# Patient Record
Sex: FEMALE | Race: BLACK OR AFRICAN AMERICAN | NOT HISPANIC OR LATINO | Employment: OTHER | URBAN - METROPOLITAN AREA
[De-identification: names, ages, dates, MRNs, and addresses within clinical notes are randomized per-mention and may not be internally consistent; named-entity substitution may affect disease eponyms.]

---

## 2023-04-03 DIAGNOSIS — I10 ESSENTIAL (PRIMARY) HYPERTENSION: ICD-10-CM

## 2023-04-03 RX ORDER — HYDROCHLOROTHIAZIDE 25 MG/1
TABLET ORAL
Qty: 90 TABLET | Refills: 1 | Status: SHIPPED | OUTPATIENT
Start: 2023-04-03 | End: 2023-10-03

## 2023-04-03 RX ORDER — ATENOLOL 50 MG/1
TABLET ORAL
Qty: 90 TABLET | Refills: 1 | Status: SHIPPED | OUTPATIENT
Start: 2023-04-03 | End: 2023-10-03

## 2023-05-10 ENCOUNTER — APPOINTMENT (OUTPATIENT)
Dept: PRIMARY CARE | Facility: CLINIC | Age: 73
End: 2023-05-10
Payer: MEDICARE

## 2023-08-30 ENCOUNTER — OFFICE VISIT (OUTPATIENT)
Dept: PRIMARY CARE | Facility: CLINIC | Age: 73
End: 2023-08-30
Payer: MEDICARE

## 2023-08-30 VITALS
BODY MASS INDEX: 28.08 KG/M2 | RESPIRATION RATE: 14 BRPM | OXYGEN SATURATION: 96 % | HEART RATE: 57 BPM | HEIGHT: 67 IN | SYSTOLIC BLOOD PRESSURE: 124 MMHG | DIASTOLIC BLOOD PRESSURE: 74 MMHG | TEMPERATURE: 95 F | WEIGHT: 178.9 LBS

## 2023-08-30 DIAGNOSIS — Z12.31 BREAST CANCER SCREENING BY MAMMOGRAM: ICD-10-CM

## 2023-08-30 DIAGNOSIS — I10 HYPERTENSION, UNSPECIFIED TYPE: ICD-10-CM

## 2023-08-30 DIAGNOSIS — Z76.89 ENCOUNTER TO ESTABLISH CARE: ICD-10-CM

## 2023-08-30 DIAGNOSIS — Z00.00 ROUTINE HISTORY AND PHYSICAL EXAMINATION OF ADULT: Primary | ICD-10-CM

## 2023-08-30 DIAGNOSIS — E66.3 OVERWEIGHT WITH BODY MASS INDEX (BMI) OF 28 TO 28.9 IN ADULT: ICD-10-CM

## 2023-08-30 DIAGNOSIS — Z78.0 ASYMPTOMATIC POSTMENOPAUSAL STATE: ICD-10-CM

## 2023-08-30 PROBLEM — H52.03 HYPEROPIA OF BOTH EYES: Status: ACTIVE | Noted: 2023-08-30

## 2023-08-30 PROBLEM — R41.3 TRANSIENT AMNESIA: Status: ACTIVE | Noted: 2022-03-07

## 2023-08-30 PROBLEM — H25.13 CATARACT, NUCLEAR SCLEROTIC, BOTH EYES: Status: ACTIVE | Noted: 2023-08-30

## 2023-08-30 PROBLEM — H52.203 ASTIGMATISM OF BOTH EYES: Status: ACTIVE | Noted: 2023-08-30

## 2023-08-30 PROBLEM — E78.00 ELEVATED LDL CHOLESTEROL LEVEL: Status: ACTIVE | Noted: 2023-08-30

## 2023-08-30 PROBLEM — E55.9 VITAMIN D DEFICIENCY: Status: ACTIVE | Noted: 2023-08-30

## 2023-08-30 PROBLEM — Z91.199 POOR COMPLIANCE: Status: ACTIVE | Noted: 2023-08-30

## 2023-08-30 PROBLEM — S33.5XXA LUMBAR SPRAIN: Status: ACTIVE | Noted: 2023-08-30

## 2023-08-30 PROBLEM — U07.1 COVID-19 VIRUS INFECTION: Status: ACTIVE | Noted: 2023-08-30

## 2023-08-30 PROBLEM — M54.50 LOW BACK PAIN: Status: ACTIVE | Noted: 2023-08-30

## 2023-08-30 PROBLEM — M25.551 HIP PAIN, RIGHT: Status: ACTIVE | Noted: 2023-08-30

## 2023-08-30 PROBLEM — F41.9 ANXIOUS MOOD: Status: ACTIVE | Noted: 2023-08-30

## 2023-08-30 PROBLEM — R73.01 IFG (IMPAIRED FASTING GLUCOSE): Status: ACTIVE | Noted: 2023-08-30

## 2023-08-30 PROCEDURE — 1159F MED LIST DOCD IN RCRD: CPT | Performed by: INTERNAL MEDICINE

## 2023-08-30 PROCEDURE — 3008F BODY MASS INDEX DOCD: CPT | Performed by: INTERNAL MEDICINE

## 2023-08-30 PROCEDURE — 99213 OFFICE O/P EST LOW 20 MIN: CPT | Performed by: INTERNAL MEDICINE

## 2023-08-30 PROCEDURE — 1160F RVW MEDS BY RX/DR IN RCRD: CPT | Performed by: INTERNAL MEDICINE

## 2023-08-30 PROCEDURE — 3074F SYST BP LT 130 MM HG: CPT | Performed by: INTERNAL MEDICINE

## 2023-08-30 PROCEDURE — 1036F TOBACCO NON-USER: CPT | Performed by: INTERNAL MEDICINE

## 2023-08-30 PROCEDURE — 99397 PER PM REEVAL EST PAT 65+ YR: CPT | Performed by: INTERNAL MEDICINE

## 2023-08-30 PROCEDURE — 3078F DIAST BP <80 MM HG: CPT | Performed by: INTERNAL MEDICINE

## 2023-08-30 RX ORDER — TRIAMCINOLONE ACETONIDE 0.25 MG/G
CREAM TOPICAL
COMMUNITY

## 2023-08-30 RX ORDER — GENTAMICIN SULFATE 1 MG/G
OINTMENT TOPICAL
COMMUNITY
Start: 2021-04-21 | End: 2023-08-30 | Stop reason: ALTCHOICE

## 2023-08-30 RX ORDER — ACETAMINOPHEN 500 MG
TABLET ORAL
COMMUNITY
Start: 2021-04-05

## 2023-08-30 ASSESSMENT — ENCOUNTER SYMPTOMS
VOMITING: 0
ABDOMINAL PAIN: 0
RHINORRHEA: 0
CHILLS: 0
UNEXPECTED WEIGHT CHANGE: 0
FATIGUE: 0
PALPITATIONS: 0
DIARRHEA: 0
FEVER: 0
DYSPHORIC MOOD: 0
HEADACHES: 0
ARTHRALGIAS: 0
DYSURIA: 0
COUGH: 0
CONFUSION: 0
POLYPHAGIA: 0
JOINT SWELLING: 0
NERVOUS/ANXIOUS: 0
POLYDIPSIA: 0
DIZZINESS: 0
NAUSEA: 0
SORE THROAT: 0
WHEEZING: 0
EYES NEGATIVE: 1
SHORTNESS OF BREATH: 0
BLOOD IN STOOL: 0
NUMBNESS: 0

## 2023-08-30 ASSESSMENT — PATIENT HEALTH QUESTIONNAIRE - PHQ9
SUM OF ALL RESPONSES TO PHQ9 QUESTIONS 1 AND 2: 0
2. FEELING DOWN, DEPRESSED OR HOPELESS: NOT AT ALL
1. LITTLE INTEREST OR PLEASURE IN DOING THINGS: NOT AT ALL

## 2023-08-30 NOTE — PROGRESS NOTES
Subjective   Patient ID: Maite Loya is a 73 y.o. female who presents for Annual Exam (Patient is here to establish care as well as annual physical.).  The patient is a 72 YO female who is being seen today for a physical and to establish care.  She has Hypertension which is managed with medication and diet.  She also exercises regularly.         Review of Systems   Constitutional:  Negative for chills, fatigue, fever and unexpected weight change.   HENT:  Positive for dental problem (needs crown and new filling. Is seeing a dentist.). Negative for congestion, rhinorrhea and sore throat.    Eyes: Negative.         Most recent eye exam was appx 3 mths ago.   Respiratory:  Negative for cough, shortness of breath and wheezing.    Cardiovascular:  Negative for chest pain, palpitations and leg swelling.   Gastrointestinal:  Negative for abdominal pain, blood in stool, diarrhea, nausea and vomiting.        Most recent colonoscopy was in January 2022.   Endocrine: Negative for polydipsia, polyphagia and polyuria.   Genitourinary:  Negative for dysuria, vaginal bleeding and vaginal discharge.        Hx of Hysterectomy  for menorrhagia.   Musculoskeletal:  Negative for arthralgias (Hx  of bilateral hip replacement. Right hip aches sometimes. SXs improve with Yoga stretches and walking.) and joint swelling.   Skin:  Positive for rash (Hx of Eczema of legs).   Neurological:  Negative for dizziness (Hx of Vertigo. No current SXs.), syncope, numbness and headaches.   Psychiatric/Behavioral:  Negative for confusion, dysphoric mood and suicidal ideas. The patient is not nervous/anxious.        Objective   Physical Exam  Vitals reviewed.   Constitutional:       General: She is not in acute distress.     Appearance: She is not ill-appearing.   HENT:      Head: Normocephalic.      Right Ear: Tympanic membrane and external ear normal.      Left Ear: Tympanic membrane and external ear normal.      Mouth/Throat:      Mouth: Mucous  membranes are moist.      Pharynx: Oropharynx is clear.   Eyes:      Extraocular Movements: Extraocular movements intact.      Conjunctiva/sclera: Conjunctivae normal.      Pupils: Pupils are equal, round, and reactive to light.   Cardiovascular:      Rate and Rhythm: Regular rhythm. Bradycardia present.      Heart sounds: Normal heart sounds.   Pulmonary:      Effort: Pulmonary effort is normal.      Breath sounds: Normal breath sounds.   Abdominal:      General: Bowel sounds are normal.      Palpations: Abdomen is soft. There is no mass.      Tenderness: There is no abdominal tenderness.   Musculoskeletal:         General: No tenderness.      Cervical back: Neck supple. No tenderness.      Right lower leg: No edema.      Left lower leg: No edema.   Lymphadenopathy:      Cervical: No cervical adenopathy.   Skin:     General: Skin is warm and dry.      Findings: Rash (chronic mildly hyperpigmented rash of pretibial area bilaterally.) present.   Neurological:      General: No focal deficit present.      Mental Status: She is alert and oriented to person, place, and time.   Psychiatric:         Mood and Affect: Mood normal.         Behavior: Behavior normal.         Assessment/Plan     Maite was seen today for annual exam.  Diagnoses and all orders for this visit:  Routine history and physical examination of adult (Primary)  Comments:  There were no acute physical findings present on examination.  Plan: Continue regular exercise  -Heart healthy diet  Orders:  -     Basic Metabolic Panel; Future  -     Lipid Panel; Future  Breast cancer screening by mammogram  -     BI mammo bilateral screening tomosynthesis; Future  Encounter to establish care  Overweight with body mass index (BMI) of 28 to 28.9 in adult  Comments:  Resume weight reduction  - Lifestyle modification  Asymptomatic postmenopausal state  -     XR DEXA bone density; Future  Hypertension, unspecified type  Comments:  Controlled with current treatment. BP  today is 124/74. Goal is BP<130/80.  Plan: Continue current treatment.     F/U in 3 mths for HTN

## 2023-08-30 NOTE — PATIENT INSTRUCTIONS
Assessment/Plan     Maite was seen today for annual exam.  Diagnoses and all orders for this visit:  Routine history and physical examination of adult (Primary)  Comments:  There were no acute physical findings present on examination.  Plan: Continue regular exercise  -Heart healthy diet  Orders:  -     Basic Metabolic Panel; Future  -     Lipid Panel; Future  Breast cancer screening by mammogram  -     BI mammo bilateral screening tomosynthesis; Future  Encounter to establish care  Overweight with body mass index (BMI) of 28 to 28.9 in adult  Comments:  Resume weight reduction  - Lifestyle modification  Asymptomatic postmenopausal state  -     XR DEXA bone density; Future  Hypertension, unspecified type  Comments:  Controlled with current treatment. BP today is 124/74. Goal is BP<130/80.  Plan: Continue current treatment.     F/U in 3 mths for HTN

## 2023-09-25 ENCOUNTER — LAB (OUTPATIENT)
Dept: LAB | Facility: LAB | Age: 73
End: 2023-09-25
Payer: MEDICARE

## 2023-09-25 DIAGNOSIS — Z00.00 ROUTINE HISTORY AND PHYSICAL EXAMINATION OF ADULT: ICD-10-CM

## 2023-09-25 LAB
ANION GAP IN SER/PLAS: 15 MMOL/L (ref 10–20)
CALCIUM (MG/DL) IN SER/PLAS: 10 MG/DL (ref 8.6–10.6)
CARBON DIOXIDE, TOTAL (MMOL/L) IN SER/PLAS: 29 MMOL/L (ref 21–32)
CHLORIDE (MMOL/L) IN SER/PLAS: 101 MMOL/L (ref 98–107)
CHOLESTEROL (MG/DL) IN SER/PLAS: 220 MG/DL (ref 0–199)
CHOLESTEROL IN HDL (MG/DL) IN SER/PLAS: 58.9 MG/DL
CHOLESTEROL/HDL RATIO: 3.7
CREATININE (MG/DL) IN SER/PLAS: 1.16 MG/DL (ref 0.5–1.05)
GFR FEMALE: 50 ML/MIN/1.73M2
GLUCOSE (MG/DL) IN SER/PLAS: 85 MG/DL (ref 74–99)
LDL: 143 MG/DL (ref 0–99)
POTASSIUM (MMOL/L) IN SER/PLAS: 4 MMOL/L (ref 3.5–5.3)
SODIUM (MMOL/L) IN SER/PLAS: 141 MMOL/L (ref 136–145)
TRIGLYCERIDE (MG/DL) IN SER/PLAS: 92 MG/DL (ref 0–149)
UREA NITROGEN (MG/DL) IN SER/PLAS: 18 MG/DL (ref 6–23)
VLDL: 18 MG/DL (ref 0–40)

## 2023-09-25 PROCEDURE — 36415 COLL VENOUS BLD VENIPUNCTURE: CPT

## 2023-09-25 PROCEDURE — 80061 LIPID PANEL: CPT

## 2023-09-25 PROCEDURE — 80048 BASIC METABOLIC PNL TOTAL CA: CPT

## 2023-09-30 DIAGNOSIS — I10 ESSENTIAL (PRIMARY) HYPERTENSION: ICD-10-CM

## 2023-10-03 RX ORDER — ATENOLOL 50 MG/1
TABLET ORAL
Qty: 90 TABLET | Refills: 0 | Status: SHIPPED | OUTPATIENT
Start: 2023-10-03 | End: 2024-01-08

## 2023-10-03 RX ORDER — HYDROCHLOROTHIAZIDE 25 MG/1
TABLET ORAL
Qty: 90 TABLET | Refills: 0 | Status: SHIPPED | OUTPATIENT
Start: 2023-10-03 | End: 2024-01-08

## 2023-10-18 ENCOUNTER — APPOINTMENT (OUTPATIENT)
Dept: ORTHOPEDIC SURGERY | Facility: CLINIC | Age: 73
End: 2023-10-18
Payer: MEDICARE

## 2023-10-24 PROBLEM — E66.3 OVERWEIGHT WITH BODY MASS INDEX (BMI) OF 28 TO 28.9 IN ADULT: Status: ACTIVE | Noted: 2023-10-24

## 2023-10-24 RX ORDER — AMLODIPINE BESYLATE 2.5 MG/1
2.5 TABLET ORAL DAILY
COMMUNITY

## 2023-10-24 RX ORDER — GENTAMICIN SULFATE 1 MG/G
OINTMENT TOPICAL
COMMUNITY

## 2023-10-24 RX ORDER — NEOMYCIN AND POLYMYXIN B SULFATES AND BACITRACIN ZINC 400; 3.5; 1 [USP'U]/G; MG/G; [USP'U]/G
OINTMENT OPHTHALMIC
COMMUNITY

## 2023-10-24 RX ORDER — ALCLOMETASONE DIPROPIONATE 0.5 MG/G
CREAM TOPICAL
COMMUNITY

## 2023-10-26 ENCOUNTER — OFFICE VISIT (OUTPATIENT)
Dept: ORTHOPEDIC SURGERY | Facility: CLINIC | Age: 73
End: 2023-10-26
Payer: MEDICARE

## 2023-10-26 ENCOUNTER — ANCILLARY PROCEDURE (OUTPATIENT)
Dept: RADIOLOGY | Facility: CLINIC | Age: 73
End: 2023-10-26
Payer: MEDICARE

## 2023-10-26 VITALS — BODY MASS INDEX: 28.61 KG/M2 | HEIGHT: 66 IN | WEIGHT: 178 LBS

## 2023-10-26 DIAGNOSIS — M70.61 TROCHANTERIC BURSITIS OF BOTH HIPS: ICD-10-CM

## 2023-10-26 DIAGNOSIS — M25.551 HIP PAIN, RIGHT: ICD-10-CM

## 2023-10-26 DIAGNOSIS — M70.62 TROCHANTERIC BURSITIS OF BOTH HIPS: ICD-10-CM

## 2023-10-26 PROCEDURE — 73502 X-RAY EXAM HIP UNI 2-3 VIEWS: CPT | Mod: RIGHT SIDE | Performed by: RADIOLOGY

## 2023-10-26 PROCEDURE — 73502 X-RAY EXAM HIP UNI 2-3 VIEWS: CPT | Mod: RT

## 2023-10-26 PROCEDURE — 99213 OFFICE O/P EST LOW 20 MIN: CPT | Performed by: ORTHOPAEDIC SURGERY

## 2023-10-26 PROCEDURE — 1160F RVW MEDS BY RX/DR IN RCRD: CPT | Performed by: ORTHOPAEDIC SURGERY

## 2023-10-26 PROCEDURE — 1159F MED LIST DOCD IN RCRD: CPT | Performed by: ORTHOPAEDIC SURGERY

## 2023-10-26 PROCEDURE — 1036F TOBACCO NON-USER: CPT | Performed by: ORTHOPAEDIC SURGERY

## 2023-10-26 PROCEDURE — 3008F BODY MASS INDEX DOCD: CPT | Performed by: ORTHOPAEDIC SURGERY

## 2023-10-26 PROCEDURE — 99203 OFFICE O/P NEW LOW 30 MIN: CPT | Performed by: ORTHOPAEDIC SURGERY

## 2023-10-26 NOTE — PROGRESS NOTES
Patient is a 73-year-old female presents today for evaluation of bilateral total hip arthroplasties.  She had her hips replaced in 2015 at an outside institution in [place].  Her left was done first and the right in a staged fashion.  She complains of some lateral sided hip pain and tightness.  She does not have any groin pain.  She did not have any problems after surgery regards to wound healing or infection.    Bilateral hips:  AAOx3, NAD, walks with a non-antalgic gait  No pain with flexion internal rotation  Negative Stinchfield  Mild TTP over trochanter laterally  5/5 Hip flexion/knee extension/df/pf/ehl  SILT in a evert/saph/per/tib distribution  ½ dorsalis pedis and posterior tibial pulse  no popliteal or inguinal lymphadenopathy  no other overlying lesions  mood: euthymic  Respirations non labored    Plain films were reviewed by myself in clinic today.  She has been cemented bilateral total hip arthroplasties in place with an S-ROM stem.  Alignment appears adequate.  There is no signs of loosening failing.    Discussed with her today that her hip replaced looks fine.  She does not require anything further surgically.  We will get her into some physical therapy for trochanteric bursitis, IT band stretching and mobility.  All of her questions were answered.  She will follow-up with me on an as-needed basis.    This note was created using voice recognition software and was not corrected for typographical or grammatical errors.

## 2024-01-02 DIAGNOSIS — I10 ESSENTIAL (PRIMARY) HYPERTENSION: ICD-10-CM

## 2024-01-08 RX ORDER — HYDROCHLOROTHIAZIDE 25 MG/1
TABLET ORAL
Qty: 90 TABLET | Refills: 0 | Status: SHIPPED | OUTPATIENT
Start: 2024-01-08 | End: 2024-03-29

## 2024-01-08 RX ORDER — ATENOLOL 50 MG/1
TABLET ORAL
Qty: 90 TABLET | Refills: 0 | Status: SHIPPED | OUTPATIENT
Start: 2024-01-08 | End: 2024-03-29

## 2024-03-25 ENCOUNTER — TELEPHONE (OUTPATIENT)
Dept: PRIMARY CARE | Facility: CLINIC | Age: 74
End: 2024-03-25
Payer: MEDICARE

## 2024-03-25 NOTE — TELEPHONE ENCOUNTER
Patient came into the office regarding a bill she received for her NPV with Dr. Khoury on 8/30/23. She stated she was seen for a physical. She was told by [employer] insurance company (supplemental insurance) that Medicare did not receive the correct code. She was not notified Dr. Khoury was retiring.

## 2024-03-28 DIAGNOSIS — I10 ESSENTIAL (PRIMARY) HYPERTENSION: ICD-10-CM

## 2024-03-29 RX ORDER — ATENOLOL 50 MG/1
TABLET ORAL
Qty: 90 TABLET | Refills: 0 | Status: SHIPPED | OUTPATIENT
Start: 2024-03-29

## 2024-03-29 RX ORDER — HYDROCHLOROTHIAZIDE 25 MG/1
TABLET ORAL
Qty: 90 TABLET | Refills: 0 | Status: SHIPPED | OUTPATIENT
Start: 2024-03-29

## 2024-05-16 ENCOUNTER — OFFICE VISIT (OUTPATIENT)
Dept: DERMATOLOGY | Facility: CLINIC | Age: 74
End: 2024-05-16
Payer: MEDICARE

## 2024-05-16 DIAGNOSIS — R21 RASH AND OTHER NONSPECIFIC SKIN ERUPTION: Primary | ICD-10-CM

## 2024-05-16 DIAGNOSIS — L85.3 XEROSIS CUTIS: ICD-10-CM

## 2024-05-16 PROCEDURE — 11104 PUNCH BX SKIN SINGLE LESION: CPT | Performed by: DERMATOLOGY

## 2024-05-16 PROCEDURE — 1159F MED LIST DOCD IN RCRD: CPT | Performed by: DERMATOLOGY

## 2024-05-16 PROCEDURE — 99204 OFFICE O/P NEW MOD 45 MIN: CPT | Performed by: DERMATOLOGY

## 2024-05-16 PROCEDURE — 3008F BODY MASS INDEX DOCD: CPT | Performed by: DERMATOLOGY

## 2024-05-16 RX ORDER — FLUOCINONIDE 0.5 MG/G
OINTMENT TOPICAL
Qty: 60 G | Refills: 1 | Status: SHIPPED | OUTPATIENT
Start: 2024-05-16

## 2024-05-16 ASSESSMENT — DERMATOLOGY PATIENT ASSESSMENT
DO YOU USE A TANNING BED: NO
DO YOU HAVE ANY NEW OR CHANGING LESIONS: NO
DO YOU HAVE IRREGULAR MENSTRUAL CYCLES: NO
ARE YOU AN ORGAN TRANSPLANT RECIPIENT: NO
ARE YOU ON BIRTH CONTROL: NO
ARE YOU TRYING TO GET PREGNANT: NO
DO YOU USE SUNSCREEN: OCCASIONALLY

## 2024-05-16 ASSESSMENT — ITCH NUMERIC RATING SCALE: HOW SEVERE IS YOUR ITCHING?: 2

## 2024-05-16 ASSESSMENT — DERMATOLOGY QUALITY OF LIFE (QOL) ASSESSMENT
ARE THERE EXCLUSIONS OR EXCEPTIONS FOR THE QUALITY OF LIFE ASSESSMENT: NO
ARE THERE EXCLUSIONS OR EXCEPTIONS FOR THE QUALITY OF LIFE ASSESSMENT: NO

## 2024-05-16 NOTE — PROGRESS NOTES
Subjective     Maite Loya is a 73 y.o. female who presents for the following: Rash.  She states the rash on her legs has been persistent for over 2 years.  She notes the lesions are red, raised, scaly, and itchy.  She denies any other areas of involvement.  She has tried using gentamicin ointment and triamcinolone 0.025% cream, but they have not helped very much.      Review of Systems:  No other skin or systemic complaints other than what is documented elsewhere in the note.    The following portions of the chart were reviewed this encounter and updated as appropriate:       Skin Cancer History  No skin cancer on file.    Specialty Problems    None      Past Dermatologic / Past Relevant Medical History:    No history of eczema, psoriasis, or lichen planus    Family History:    No family history of eczema, psoriasis, or lichen planus    Social History:    The patient states she works as a  and will be traveling to [place] soon    Allergies:  Patient has no known allergies.    Current Medications / CAM's:    Current Outpatient Medications:     alclometasone (Aclovate) 0.05 % cream, , Disp: , Rfl:     amLODIPine (Norvasc) 2.5 mg tablet, Take 1 tablet (2.5 mg) by mouth once daily., Disp: , Rfl:     atenolol (Tenormin) 50 mg tablet, TAKE 1 TABLET BY MOUTH EVERY DAY, Disp: 90 tablet, Rfl: 0    cholecalciferol (Vitamin D-3) 5,000 Units tablet, Take by mouth., Disp: , Rfl:     gentamicin (Garamycin) 0.1 % ointment, , Disp: , Rfl:     hydroCHLOROthiazide (HYDRODiuril) 25 mg tablet, TAKE 1 TABLET BY MOUTH EVERY DAY, Disp: 90 tablet, Rfl: 0    neomycin-bacitracin-polymyxin (Polysporin) ophthalmic ointment, , Disp: , Rfl:     triamcinolone (Kenalog) 0.025 % cream, Triamcinolone Acetonide 0.025 % External Cream  Refills: 0     Active, Disp: , Rfl:     fluocinonide (Lidex) 0.05 % ointment, Apply twice daily to affected areas of legs (avoid face, groin, body folds) for 2 weeks, Disp: 60 g, Rfl: 1     Objective    Well appearing patient in no apparent distress; mood and affect are within normal limits.    A skin examination was performed including: Face, neck, and bilateral upper and lower extremities. All findings within normal limits unless otherwise noted below.    Assessment/Plan   1. Rash and other nonspecific skin eruption  Left Anterior Mid-Leg  On her bilateral anterior legs, there are multiple similar-appearing erythematous to purplish, scaly, hyperkeratotic, thickened papules and small plaques          Erythematous to purplish, scaly papules and plaques - bilateral anterior legs.  The clinical differential diagnosis for these lesions includes possibly hypertrophic lichen planus versus psoriasis versus nummular eczema.  The nature of each of these conditions and management options, including possible biopsy for further diagnostic evaluation, were discussed extensively with the patient today.  After discussing the risks and benefits of various management options, the patient expressed understanding and wishes to undergo biopsy today.  Thus, at this time, I recommend punch biopsy of a representative lesion on the patient's left anterior mid leg in the office today for further diagnostic evaluation.  In the meantime, while we await biopsy results, I recommend empiric topical steroid therapy with Fluocinonide 0.05% ointment, which the patient was instructed to apply twice daily to the affected areas of her legs (avoid face, groin, body folds) for the next 2 weeks.  The risks, benefits, and side effects of this medication, including possible skin atrophy with its overuse, were discussed.  The patient expressed understanding, is in agreement with this plan, and wishes to proceed with the biopsy today.    Skin biopsy - Left Anterior Mid-Leg  Type of biopsy: punch    Informed consent: discussed and consent obtained    Timeout: patient name, date of birth, surgical site, and procedure verified    Procedure prep:  Patient  was prepped and draped  Anesthesia: the lesion was anesthetized in a standard fashion    Anesthetic:  1% lidocaine w/ epinephrine 1-100,000 local infiltration  Punch size:  4 mm  Suture size:  4-0  Suture type: Prolene (polypropylene)    Suture removal (days):  14  Hemostasis achieved with: suture    Outcome: patient tolerated procedure well    Post-procedure details: sterile dressing applied and wound care instructions given    Dressing type: bandage and petrolatum      Staff Communication: Dermatology Local Anesthesia: 1 % Lidocaine / Epinephrine - Amount:0.5ml - Left Anterior Mid-Leg    fluocinonide (Lidex) 0.05 % ointment - Left Anterior Mid-Leg  Apply twice daily to affected areas of legs (avoid face, groin, body folds) for 2 weeks    Specimen 1 - Dermatopathology- DERM LAB  Differential Diagnosis: Hypertrophic LP v Psoriasis  Check Margins Yes/No?:    Comments:    Dermpath Lab: Routine Histopathology (formalin-fixed tissue)    2. Xerosis cutis  Diffuse generalized dry, scaly skin.    Xerosis.  I emphasized the importance of dry, sensitive skin care, including the use of a mild soap, such as Dove, and frequent and aggressive moisturization, at least twice daily and immediately following showers or baths, with recommended over-the-counter moisturizing creams, such as Eucerin, Cetaphil, Cerave, or Aveeno, or Vaseline or Aquaphor ointments.

## 2024-05-20 LAB
LABORATORY COMMENT REPORT: NORMAL
PATH REPORT.FINAL DX SPEC: NORMAL
PATH REPORT.GROSS SPEC: NORMAL
PATH REPORT.RELEVANT HX SPEC: NORMAL
PATH REPORT.TOTAL CANCER: NORMAL

## 2024-06-06 ENCOUNTER — OFFICE VISIT (OUTPATIENT)
Dept: DERMATOLOGY | Facility: CLINIC | Age: 74
End: 2024-06-06
Payer: MEDICARE

## 2024-06-06 DIAGNOSIS — L43.0 HYPERTROPHIC LICHEN PLANUS: Primary | ICD-10-CM

## 2024-06-06 DIAGNOSIS — L85.3 XEROSIS CUTIS: ICD-10-CM

## 2024-06-06 PROCEDURE — 3008F BODY MASS INDEX DOCD: CPT | Performed by: DERMATOLOGY

## 2024-06-06 PROCEDURE — 11900 INJECT SKIN LESIONS </W 7: CPT | Performed by: DERMATOLOGY

## 2024-06-06 PROCEDURE — 1159F MED LIST DOCD IN RCRD: CPT | Performed by: DERMATOLOGY

## 2024-06-06 PROCEDURE — 99214 OFFICE O/P EST MOD 30 MIN: CPT | Performed by: DERMATOLOGY

## 2024-06-06 RX ADMIN — TRIAMCINOLONE ACETONIDE 36 MG: 40 INJECTION, SUSPENSION INTRA-ARTICULAR; INTRAMUSCULAR at 01:09

## 2024-06-06 ASSESSMENT — ITCH NUMERIC RATING SCALE: HOW SEVERE IS YOUR ITCHING?: 0

## 2024-06-09 RX ORDER — CLOBETASOL PROPIONATE 0.5 MG/G
OINTMENT TOPICAL
Qty: 60 G | Refills: 1 | Status: SHIPPED | OUTPATIENT
Start: 2024-06-09

## 2024-06-09 RX ORDER — TRIAMCINOLONE ACETONIDE 40 MG/ML
36 INJECTION, SUSPENSION INTRA-ARTICULAR; INTRAMUSCULAR ONCE
Status: COMPLETED | OUTPATIENT
Start: 2024-06-06 | End: 2024-06-06

## 2024-06-09 NOTE — PROGRESS NOTES
"Subjective     Maite Loya is a 73 y.o. female who presents for the following: Follow-up.  She states the rash on her legs has been persistent for over 2 years.  She notes the lesions are red, raised, scaly, and itchy.  She denies any other areas of involvement.  She has tried using gentamicin ointment and triamcinolone 0.025% cream, but they have not helped very much.    Punch biopsy of a representative lesion on her left anterior mid leg performed at her last visit in our office on 5/16/24 revealed findings \"consistent with hypertrophic lichen planus,\" which was the favored clinical diagnosis.    Today, the patient states the biopsy site healed well.  She notes the lesions remain red, raised, scaly, and itchy.  She denies any other areas of involvement since her last visit.      Review of Systems:  No other skin or systemic complaints other than what is documented elsewhere in the note.    The following portions of the chart were reviewed this encounter and updated as appropriate:       Skin Cancer History  No skin cancer on file.    Specialty Problems    None      Past Dermatologic / Past Relevant Medical History:    - biopsy-proven Hypertrophic Lichen Planus diagnosed on 5/16/24  - no history of eczema, psoriasis, or hepatitis C    Family History:    No family history of eczema, psoriasis, or lichen planus    Social History:    The patient states she works as a     Allergies:  Patient has no known allergies.    Current Medications / CAM's:    Current Outpatient Medications:     alclometasone (Aclovate) 0.05 % cream, , Disp: , Rfl:     amLODIPine (Norvasc) 2.5 mg tablet, Take 1 tablet (2.5 mg) by mouth once daily., Disp: , Rfl:     atenolol (Tenormin) 50 mg tablet, TAKE 1 TABLET BY MOUTH EVERY DAY, Disp: 90 tablet, Rfl: 0    cholecalciferol (Vitamin D-3) 5,000 Units tablet, Take by mouth., Disp: , Rfl:     fluocinonide (Lidex) 0.05 % ointment, Apply twice daily to affected areas of legs (avoid " right shoulder pain face, groin, body folds) for 2 weeks, Disp: 60 g, Rfl: 1    gentamicin (Garamycin) 0.1 % ointment, , Disp: , Rfl:     hydroCHLOROthiazide (HYDRODiuril) 25 mg tablet, TAKE 1 TABLET BY MOUTH EVERY DAY, Disp: 90 tablet, Rfl: 0    neomycin-bacitracin-polymyxin (Polysporin) ophthalmic ointment, , Disp: , Rfl:     triamcinolone (Kenalog) 0.025 % cream, Triamcinolone Acetonide 0.025 % External Cream  Refills: 0     Active, Disp: , Rfl:     clobetasol (Temovate) 0.05 % ointment, Apply twice daily to affected areas of legs (avoid face, groin, body folds) for 3-4 weeks, taper to twice daily on weekends only; repeat every 6-8 weeks as needed for flares, Disp: 60 g, Rfl: 1     Objective   Well appearing patient in no apparent distress; mood and affect are within normal limits.    A skin examination was performed including: Face, neck, and extremities. All findings within normal limits unless otherwise noted below.    Assessment/Plan   1. Hypertrophic lichen planus  Right Lower Leg - Anterior  On her bilateral anterior legs, there are multiple similar-appearing erythematous to purplish, scaly, hyperkeratotic, thickened papules and small plaques, including one larger 2.5 cm plaque on her right medial distal leg                      Hypertrophic Lichen Planus -bilateral anterior legs; biopsy-proven diagnosis.  The potentially chronic and intermittently flaring nature of this condition, the occasional association of LP with Hepatitis C, and management options were discussed extensively with the patient in the office today.  At this time, given the occasional association of LP with Hepatitis C, I recommend she have a screening Hepatitis C antibody drawn, which we will follow-up.    In addition, I spent a great deal of time discussing various treatment options, including topical therapy, intra-lesional corticosteroid injections, UV phototherapy, and systemic therapy, such as with Methotrexate.  After discussing the risks, benefits,  and side effects of each of these options at length, including possible permanent skin atrophy following IL steroid injection, the patient expressed understanding and wishes to undergo IL steroid injection of one larger plaque on her right medial distal leg in the office today and begin topical therapy.    Thus, I recommend IL steroid injection with Kenalog 20 mg/mL today and topical steroid therapy with Clobetasol 0.05% ointment, which the patient was instructed to apply twice daily to the affected areas of her legs (avoid face, groin, body folds) for the next 3-4 weeks, followed by taper to twice daily on weekends only for persistent areas and/or maintenance and moisturization with a recommended over-the-counter moisturizing cream, such as Eucerin, or Vaseline twice daily on weekdays; the patient may repeat treatment in a 3-4 week burst-and-taper fashion every 6-8 weeks as needed for future flares.  The risks, benefits, and side effects of this medication, including possible skin atrophy with overuse of topical steroids, were discussed.  Should the patient not notice significant improvement within the next 2-3 weeks following today's procedure or the lesion and/or symptoms recur or worsen at any time in the future, the patient was instructed to call our office to schedule a return visit for re-evaluation and possible further IL steroid injections if indicated at that time.  The patient expressed understanding, is in agreement with this plan, and wishes to proceed with the procedure today.  Of note, her suture was removed in the office today as well.    Hepatitis C Antibody - Right Lower Leg - Anterior    Intralesional injection - Right Lower Leg - Anterior  Intralesional Injection:   Date/Time: 6/6/2024 1:08 AM    Consent:     Consent obtained:  Verbal    Consent given by:  Patient    Risks discussed:  Poor cosmetic result, pain, infection and bleeding    Alternatives discussed:  No treatment  Pre-Procedure  Details:     Prep Type:  Isopropyl alcohol  Procedure Details:   Injection:  Triamcinolone  Outcome: patient tolerated procedure well  Post-procedure details: sterile dressing applied and wound care instructions given  Dressing type: bandage   Comments:  A total of 1.8 ml of 20 mg/mL Kenalog were injected into 1 lesion(s)  Lot #: [id number]  Expiration: 1/2026    clobetasol (Temovate) 0.05 % ointment - Right Lower Leg - Anterior  Apply twice daily to affected areas of legs (avoid face, groin, body folds) for 3-4 weeks, taper to twice daily on weekends only; repeat every 6-8 weeks as needed for flares    2. Xerosis cutis  Diffuse generalized dry, scaly skin.    Xerosis.  I emphasized the importance of dry, sensitive skin care, including the use of a mild soap, such as Dove, and frequent and aggressive moisturization, at least twice daily and immediately following showers or baths, with recommended over-the-counter moisturizing creams, such as Eucerin, Cetaphil, Cerave, or Aveeno, or Vaseline or Aquaphor ointments.

## 2024-06-23 DIAGNOSIS — I10 ESSENTIAL (PRIMARY) HYPERTENSION: ICD-10-CM

## 2024-06-26 RX ORDER — ATENOLOL 50 MG/1
50 TABLET ORAL DAILY
Qty: 90 TABLET | Refills: 0 | Status: SHIPPED | OUTPATIENT
Start: 2024-06-26

## 2024-06-26 RX ORDER — HYDROCHLOROTHIAZIDE 25 MG/1
TABLET ORAL
Qty: 90 TABLET | Refills: 0 | Status: SHIPPED | OUTPATIENT
Start: 2024-06-26

## 2024-07-09 ENCOUNTER — LAB (OUTPATIENT)
Dept: LAB | Facility: LAB | Age: 74
End: 2024-07-09
Payer: MEDICARE

## 2024-07-09 DIAGNOSIS — L43.0 HYPERTROPHIC LICHEN PLANUS: ICD-10-CM

## 2024-07-09 LAB — HCV AB SER QL: NONREACTIVE

## 2024-07-09 PROCEDURE — 86803 HEPATITIS C AB TEST: CPT

## 2024-07-19 ENCOUNTER — OFFICE VISIT (OUTPATIENT)
Dept: DERMATOLOGY | Facility: CLINIC | Age: 74
End: 2024-07-19
Payer: MEDICARE

## 2024-07-19 DIAGNOSIS — L43.0 HYPERTROPHIC LICHEN PLANUS: Primary | ICD-10-CM

## 2024-07-19 DIAGNOSIS — L82.1 SEBORRHEIC KERATOSIS: ICD-10-CM

## 2024-07-19 DIAGNOSIS — L85.3 XEROSIS CUTIS: ICD-10-CM

## 2024-07-19 DIAGNOSIS — L82.0 INFLAMED SEBORRHEIC KERATOSIS: ICD-10-CM

## 2024-07-19 PROCEDURE — 11900 INJECT SKIN LESIONS </W 7: CPT | Performed by: DERMATOLOGY

## 2024-07-19 PROCEDURE — 1159F MED LIST DOCD IN RCRD: CPT | Performed by: DERMATOLOGY

## 2024-07-19 PROCEDURE — 17110 DESTRUCTION B9 LES UP TO 14: CPT | Performed by: DERMATOLOGY

## 2024-07-19 PROCEDURE — 99214 OFFICE O/P EST MOD 30 MIN: CPT | Performed by: DERMATOLOGY

## 2024-07-19 RX ORDER — TRIAMCINOLONE ACETONIDE 40 MG/ML
60 INJECTION, SUSPENSION INTRA-ARTICULAR; INTRAMUSCULAR ONCE
Status: COMPLETED | OUTPATIENT
Start: 2024-07-19 | End: 2024-07-19

## 2024-07-19 NOTE — PROGRESS NOTES
Subjective     Maite Loya is a 74 y.o. female who presents for the following: Follow-up.  The patient was last seen in our office on 6/6/24, at which time she underwent IL steroid injection with Kenalog 20 mg/mL for the single largest largest plaque of Hypertrophic Lichen Planus on her right medial distal leg.    Today, the patient reports significant improvement in the lesion on her right leg following IL steroid injections performed at her last visit.  She reports she has been putting clobetasol 0.05% ointment on a few of the other lesions, but it has not helped, and she has not put the clobetasol ointment on the lesion that had been injected.  She also notes a brown, raised, rough bump on the back right side of her neck, which has been present for several months and has been itching recently.  It has not changed in any other way, including in size, shape, or color, and it does not hurt or bleed.  She denies any other new, changing, or concerning skin lesions since her last visit; no bleeding, itching, or burning lesions.      Review of Systems:  No other skin or systemic complaints other than what is documented elsewhere in the note.    The following portions of the chart were reviewed this encounter and updated as appropriate:       Skin Cancer History  No skin cancer on file.    Specialty Problems    None      Past Dermatologic / Past Relevant Medical History:    - biopsy-proven Hypertrophic Lichen Planus diagnosed on 5/16/24  - no history of eczema, psoriasis, or hepatitis C    Family History:    No family history of eczema, psoriasis, or lichen planus    Social History:    The patient states she works as a      Allergies:  Patient has no known allergies.    Current Medications / CAM's:    Current Outpatient Medications:     alclometasone (Aclovate) 0.05 % cream, , Disp: , Rfl:     amLODIPine (Norvasc) 2.5 mg tablet, Take 1 tablet (2.5 mg) by mouth once daily., Disp: , Rfl:     atenolol  (Tenormin) 50 mg tablet, Take 1 tablet (50 mg) by mouth once daily., Disp: 90 tablet, Rfl: 0    cholecalciferol (Vitamin D-3) 5,000 Units tablet, Take by mouth., Disp: , Rfl:     clobetasol (Temovate) 0.05 % ointment, Apply twice daily to affected areas of legs (avoid face, groin, body folds) for 3-4 weeks, taper to twice daily on weekends only; repeat every 6-8 weeks as needed for flares, Disp: 60 g, Rfl: 1    fluocinonide (Lidex) 0.05 % ointment, Apply twice daily to affected areas of legs (avoid face, groin, body folds) for 2 weeks, Disp: 60 g, Rfl: 1    gentamicin (Garamycin) 0.1 % ointment, , Disp: , Rfl:     hydroCHLOROthiazide (HYDRODiuril) 25 mg tablet, TAKE 1 TABLET BY MOUTH EVERY DAY, Disp: 90 tablet, Rfl: 0    neomycin-bacitracin-polymyxin (Polysporin) ophthalmic ointment, , Disp: , Rfl:     triamcinolone (Kenalog) 0.025 % cream, Triamcinolone Acetonide 0.025 % External Cream  Refills: 0     Active, Disp: , Rfl:      Objective   Well appearing patient in no apparent distress; mood and affect are within normal limits.    A skin examination was performed including: Face, neck, and extremities. All findings within normal limits unless otherwise noted below.    Assessment/Plan   1. Hypertrophic lichen planus  Right Lower Leg - Anterior  On her bilateral anterior legs, there are multiple similar-appearing erythematous to purplish, scaly, hyperkeratotic, thickened papules and small plaques, including several larger 1.5-3 cm plaques on her right distal leg.  On her right medial distal leg, there is an erythematous to purplish, slightly hyperkeratotic, thin plaque at the site of the recently injected lesion.    Hypertrophic Lichen Planus -bilateral anterior legs; biopsy-proven diagnosis.  The potentially chronic and intermittently flaring nature of this condition, the occasional association of LP with Hepatitis C, and management options were discussed extensively with the patient in the office today.  Of note,  given the occasional association of LP with Hepatitis C, she had a screening Hepatitis C antibody drawn, which was negative.    In addition, we spent a great deal of time discussing various treatment options, including topical therapy, intra-lesional corticosteroid injections, UV phototherapy, and systemic therapy, such as with Methotrexate.  After discussing the risks, benefits, and side effects of each of these options at length, including possible permanent skin atrophy following IL steroid injection, the patient expressed understanding and wishes to undergo further IL steroid injection of a few of the larger plaques on her right anterior leg in the office today and begin topical therapy.    Thus, we recommend IL steroid injection with Kenalog 20 mg/mL today and topical steroid therapy with Clobetasol 0.05% ointment, which the patient was instructed to apply twice daily to the affected areas of her legs (avoid face, groin, body folds) for the next 3-4 weeks, followed by taper to twice daily on weekends only for persistent areas and/or maintenance and moisturization with a recommended over-the-counter moisturizing cream, such as Eucerin, or Vaseline twice daily on weekdays; the patient may repeat treatment in a 3-4 week burst-and-taper fashion every 6-8 weeks as needed for future flares.  The risks, benefits, and side effects of this medication, including possible skin atrophy with overuse of topical steroids, were discussed.  Should the patient not notice significant improvement within the next 2-3 weeks following today's procedure or the lesion and/or symptoms recur or worsen at any time in the future, the patient was instructed to call our office to schedule a return visit for re-evaluation and possible further IL steroid injections if indicated at that time.  The patient expressed understanding, is in agreement with this plan, and wishes to proceed with the procedure today.    triamcinolone acetonide  (Kenalog-40) injection 60 mg - Right Lower Leg - Anterior      Intralesional injection - Right Lower Leg - Anterior  Intralesional Injection:   Consent:     Consent obtained:  Verbal    Consent given by:  Patient    Risks discussed:  Poor cosmetic result, pain, infection and bleeding    Alternatives discussed:  No treatment  Pre-Procedure Details:     Prep Type:  Isopropyl alcohol  Procedure Details:   Injection:  Triamcinolone  Outcome: patient tolerated procedure well  Post-procedure details: sterile dressing applied and wound care instructions given  Dressing type: bandage   Comments:  A total of 3 ml of 20 mg/mL Kenalog were injected into 3 lesion(s)    Related Medications  clobetasol (Temovate) 0.05 % ointment  Apply twice daily to affected areas of legs (avoid face, groin, body folds) for 3-4 weeks, taper to twice daily on weekends only; repeat every 6-8 weeks as needed for flares    2. Xerosis cutis  Diffuse generalized dry, scaly skin.    Xerosis.  We emphasized the importance of dry, sensitive skin care, including the use of a mild soap, such as Dove, and frequent and aggressive moisturization, at least twice daily and immediately following showers or baths, with recommended over-the-counter moisturizing creams, such as Eucerin, Cetaphil, Cerave, or Aveeno, or Vaseline or Aquaphor ointments.    3. Inflamed seborrheic keratosis  Neck - Anterior  On the patient's right posterior neck, there is a 7 mm erythematous and brown-colored, hyperkeratotic, stuck-on appearing papule with a surrounding rim of erythema    Inflamed Seborrheic Keratosis -right posterior neck.  The benign nature of this lesion was discussed with the patient today and reassurance provided.  Given the history the patient provides of frequent irritation and associated symptoms as well as its inflamed appearance on exam today, we offered to treat this lesion with liquid nitrogen cryotherapy.  The patient expressed understanding, is in agreement  with this plan, and wishes to proceed with cryotherapy today.    Destr of lesion - Neck - Anterior  Complexity: simple    Destruction method: cryotherapy    Informed consent: discussed and consent obtained    Lesion destroyed using liquid nitrogen: Yes    Cryotherapy cycles:  2  Outcome: patient tolerated procedure well with no complications    Post-procedure details: wound care instructions given      4. Seborrheic keratosis  Scattered on the patient's face, neck, and extremities, there are multiple brown-colored, hyperkeratotic, stuck-on appearing papules of varying size and shape    Seborrheic Keratoses - the benign nature of these lesions was discussed with the patient today and reassurance provided.  No treatment is medically indicated for the noninflamed SKs at this time.           Jose D Park MD  Department of Dermatology      I saw and evaluated the patient. I personally obtained the key and critical portions of the history and physical exam or was physically present for key and critical portions performed by the resident/fellow. I reviewed the resident/fellow's documentation and discussed the patient with the resident/fellow. I agree with the resident/fellow's medical decision making as documented in the note.    Geoffrey Blackwood MD

## 2024-09-16 ENCOUNTER — APPOINTMENT (OUTPATIENT)
Dept: DERMATOLOGY | Facility: CLINIC | Age: 74
End: 2024-09-16
Payer: MEDICARE

## 2024-09-16 DIAGNOSIS — L85.3 XEROSIS CUTIS: ICD-10-CM

## 2024-09-16 DIAGNOSIS — L43.0 HYPERTROPHIC LICHEN PLANUS: Primary | ICD-10-CM

## 2024-09-16 PROCEDURE — 99214 OFFICE O/P EST MOD 30 MIN: CPT | Performed by: DERMATOLOGY

## 2024-09-16 PROCEDURE — 11901 INJECT SKIN LESIONS >7: CPT | Performed by: DERMATOLOGY

## 2024-09-16 PROCEDURE — 1159F MED LIST DOCD IN RCRD: CPT | Performed by: DERMATOLOGY

## 2024-09-16 RX ORDER — TRIAMCINOLONE ACETONIDE 40 MG/ML
40 INJECTION, SUSPENSION INTRA-ARTICULAR; INTRAMUSCULAR ONCE
Status: COMPLETED | OUTPATIENT
Start: 2024-09-16 | End: 2024-09-16

## 2024-09-16 NOTE — PROGRESS NOTES
Subjective     Maite Loya is a 74 y.o. female who presents for the following: Follow-up.  She was last seen in our office on 7/19/24, at which time she underwent IL steroid injection with Kenalog 20 mg/mL for 3 larger plaques of Hypertrophic Lichen Planus on her right leg.    Today, the patient reports significant improvement in the lesions on her right leg following IL steroid injections performed at her last visit, and they became less raised and less itchy.  She reports she had been putting clobetasol 0.05% ointment on a few of the other lesions, which helped, but then she stopped in mid-August, because she felt it was burning when she applied the medication, and has been applying Gold Bond and Vaseline since.  She reports a few of the plaques on her right leg have been painful and itchy recently.  She denies any other new, changing, or concerning skin lesions since her last visit; no bleeding, itching, or burning lesions.      Review of Systems:  No other skin or systemic complaints other than what is documented elsewhere in the note.    The following portions of the chart were reviewed this encounter and updated as appropriate:       Skin Cancer History  No skin cancer on file.    Specialty Problems    None      Past Dermatologic / Past Relevant Medical History:    - biopsy-proven Hypertrophic Lichen Planus diagnosed on 5/16/24  - no history of eczema, psoriasis, or hepatitis C    Family History:    No family history of eczema, psoriasis, or lichen planus    Social History:    The patient states she works as a      Allergies:  Patient has no known allergies.    Current Medications / CAM's:    Current Outpatient Medications:     alclometasone (Aclovate) 0.05 % cream, , Disp: , Rfl:     amLODIPine (Norvasc) 2.5 mg tablet, Take 1 tablet (2.5 mg) by mouth once daily., Disp: , Rfl:     atenolol (Tenormin) 50 mg tablet, Take 1 tablet (50 mg) by mouth once daily., Disp: 90 tablet, Rfl: 0     cholecalciferol (Vitamin D-3) 5,000 Units tablet, Take by mouth., Disp: , Rfl:     clobetasol (Temovate) 0.05 % ointment, Apply twice daily to affected areas of legs (avoid face, groin, body folds) for 3-4 weeks, taper to twice daily on weekends only; repeat every 6-8 weeks as needed for flares, Disp: 60 g, Rfl: 1    fluocinonide (Lidex) 0.05 % ointment, Apply twice daily to affected areas of legs (avoid face, groin, body folds) for 2 weeks, Disp: 60 g, Rfl: 1    gentamicin (Garamycin) 0.1 % ointment, , Disp: , Rfl:     hydroCHLOROthiazide (HYDRODiuril) 25 mg tablet, TAKE 1 TABLET BY MOUTH EVERY DAY, Disp: 90 tablet, Rfl: 0    neomycin-bacitracin-polymyxin (Polysporin) ophthalmic ointment, , Disp: , Rfl:     triamcinolone (Kenalog) 0.025 % cream, Triamcinolone Acetonide 0.025 % External Cream  Refills: 0     Active, Disp: , Rfl:      Objective   Well appearing patient in no apparent distress; mood and affect are within normal limits.    A skin examination was performed including: Face, neck, and extremities. All findings within normal limits unless otherwise noted below.    Assessment/Plan   1. Hypertrophic lichen planus  Right Lower Leg - Anterior  On her bilateral anterior legs, there are multiple similar-appearing erythematous to purplish, scaly, hyperkeratotic, thickened papules and small plaques, including several larger 1.5-3 cm plaques.  On her right anterior leg, there are a few erythematous to purplish, slightly hyperkeratotic, thin plaques at the site of the recently injected lesions.    Hypertrophic Lichen Planus - flare on bilateral anterior legs; biopsy-proven diagnosis.  The potentially chronic and intermittently flaring nature of this condition, the occasional association of LP with Hepatitis C, and management options were discussed extensively with the patient in the office today.  Of note, given the occasional association of LP with Hepatitis C, she had a screening Hepatitis C antibody drawn, which  was negative.    In addition, we spent a great deal of time discussing various treatment options, including topical therapy, intra-lesional corticosteroid injections, UV phototherapy, and systemic therapy, such as with Methotrexate.  After discussing the risks, benefits, and side effects of each of these options at length, including possible permanent skin atrophy following IL steroid injection, the patient expressed understanding and wishes to undergo further IL steroid injection of several of the plaques on her right anterior leg and right dorsal foot in the office today and resume topical therapy.    Thus, we recommend IL steroid injection with Kenalog 20 mg/mL today and topical steroid therapy with Clobetasol 0.05% ointment, which the patient was instructed to apply twice daily to the affected areas of her legs (avoid face, groin, body folds) for the next 3-4 weeks, followed by taper to twice daily on weekends only for persistent areas and/or maintenance and moisturization with a recommended over-the-counter moisturizing cream, such as Eucerin, or Vaseline twice daily on weekdays; the patient may repeat treatment in a 3-4 week burst-and-taper fashion every 6-8 weeks as needed for future flares.  The risks, benefits, and side effects of this medication, including possible skin atrophy with overuse of topical steroids, were discussed.  Should the patient not notice significant improvement within the next 2-3 weeks following today's procedure or the lesion and/or symptoms recur or worsen at any time in the future, the patient was instructed to call our office to schedule a return visit for re-evaluation and possible further IL steroid injections if indicated at that time.  The patient expressed understanding, is in agreement with this plan, and wishes to proceed with the procedure today.    Intralesional injection - Right Lower Leg - Anterior  Intralesional Injection:   Consent:     Consent obtained:  Verbal     Consent given by:  Patient    Risks discussed:  Poor cosmetic result, pain, infection and bleeding    Alternatives discussed:  No treatment  Pre-Procedure Details:     Prep Type:  Isopropyl alcohol  Procedure Details:   Injection:  Triamcinolone  Outcome: patient tolerated procedure well  Post-procedure details: sterile dressing applied and wound care instructions given  Dressing type: bandage   Comments:  A total of 3 ml of 20 mg/mL Kenalog were injected into 8 lesions  Lot #: [id number]  Expiration: 4/2026    triamcinolone acetonide (Kenalog-40) injection 40 mg - Right Lower Leg - Anterior      Related Procedures  Follow Up In Dermatology - Established Patient    Related Medications  clobetasol (Temovate) 0.05 % ointment  Apply twice daily to affected areas of legs (avoid face, groin, body folds) for 3-4 weeks, taper to twice daily on weekends only; repeat every 6-8 weeks as needed for flares    2. Xerosis cutis  Diffuse generalized dry, scaly skin.    Xerosis.  We emphasized the importance of dry, sensitive skin care, including the use of a mild soap, such as Dove, and frequent and aggressive moisturization, at least twice daily and immediately following showers or baths, with recommended over-the-counter moisturizing creams, such as Eucerin, Cetaphil, Cerave, or Aveeno, or Vaseline or Aquaphor ointments.         Azra Rousseau MD  PGY-2, Dermatology       I saw and evaluated the patient. I personally obtained the key and critical portions of the history and physical exam or was physically present for key and critical portions performed by the resident/fellow. I reviewed the resident/fellow's documentation and discussed the patient with the resident/fellow. I agree with the resident/fellow's medical decision making as documented in the note.    Geoffrey Blackwood MD

## 2024-09-17 ENCOUNTER — APPOINTMENT (OUTPATIENT)
Dept: PRIMARY CARE | Facility: CLINIC | Age: 74
End: 2024-09-17
Payer: MEDICARE

## 2024-09-30 DIAGNOSIS — I10 ESSENTIAL (PRIMARY) HYPERTENSION: ICD-10-CM

## 2024-10-02 RX ORDER — HYDROCHLOROTHIAZIDE 25 MG/1
TABLET ORAL
Qty: 90 TABLET | Refills: 0 | Status: SHIPPED | OUTPATIENT
Start: 2024-10-02

## 2024-10-02 RX ORDER — ATENOLOL 50 MG/1
50 TABLET ORAL DAILY
Qty: 90 TABLET | Refills: 0 | Status: SHIPPED | OUTPATIENT
Start: 2024-10-02

## 2024-11-18 ENCOUNTER — APPOINTMENT (OUTPATIENT)
Dept: DERMATOLOGY | Facility: CLINIC | Age: 74
End: 2024-11-18
Payer: MEDICARE

## 2024-11-18 DIAGNOSIS — L85.3 XEROSIS CUTIS: ICD-10-CM

## 2024-11-18 DIAGNOSIS — L43.0 HYPERTROPHIC LICHEN PLANUS: Primary | ICD-10-CM

## 2024-11-18 PROCEDURE — 99214 OFFICE O/P EST MOD 30 MIN: CPT | Performed by: DERMATOLOGY

## 2024-11-18 PROCEDURE — 11900 INJECT SKIN LESIONS </W 7: CPT | Performed by: DERMATOLOGY

## 2024-11-18 PROCEDURE — 1159F MED LIST DOCD IN RCRD: CPT | Performed by: DERMATOLOGY

## 2024-11-18 RX ORDER — TRIAMCINOLONE ACETONIDE 40 MG/ML
20 INJECTION, SUSPENSION INTRA-ARTICULAR; INTRAMUSCULAR ONCE
Status: COMPLETED | OUTPATIENT
Start: 2024-11-18 | End: 2024-11-18

## 2024-11-18 RX ADMIN — TRIAMCINOLONE ACETONIDE 20 MG: 40 INJECTION, SUSPENSION INTRA-ARTICULAR; INTRAMUSCULAR at 10:08

## 2024-11-18 ASSESSMENT — DERMATOLOGY PATIENT ASSESSMENT
DO YOU USE SUNSCREEN: OCCASIONALLY
ARE YOU TRYING TO GET PREGNANT: NO
DO YOU HAVE ANY NEW OR CHANGING LESIONS: NO
ARE YOU ON BIRTH CONTROL: NO
DO YOU USE A TANNING BED: NO
DO YOU HAVE IRREGULAR MENSTRUAL CYCLES: NO

## 2024-11-18 ASSESSMENT — DERMATOLOGY QUALITY OF LIFE (QOL) ASSESSMENT: ARE THERE EXCLUSIONS OR EXCEPTIONS FOR THE QUALITY OF LIFE ASSESSMENT: NO

## 2024-11-18 NOTE — PROGRESS NOTES
Subjective     Maite Loya is a 74 y.o. female who presents for the following: Follow-up.  She was last seen in our office on 9/16/24, at which time she underwent IL steroid injection with Kenalog 20 mg/mL for larger plaques of Hypertrophic Lichen Planus on her right leg.    Today, the patient reports significant improvement in the lesions on her right leg following IL steroid injections performed at her last visit, and they became less raised and less itchy.  She reports she had been putting clobetasol 0.05% ointment on the lesions 4 times a week due to some itching she notices with application.  She would like to have the lesions on her left leg treated today if possible, because they have become very raised and itchy, especially when they rub on her pants.  She denies any other new, changing, or concerning skin lesions since her last visit; no bleeding, itching, or burning lesions.      Review of Systems:  No other skin or systemic complaints other than what is documented elsewhere in the note.    The following portions of the chart were reviewed this encounter and updated as appropriate:       Skin Cancer History  No skin cancer on file.    Specialty Problems    None      Past Dermatologic / Past Relevant Medical History:    - biopsy-proven Hypertrophic Lichen Planus diagnosed on 5/16/24  - no history of eczema, psoriasis, or hepatitis C    Family History:    No family history of eczema, psoriasis, or lichen planus    Social History:    The patient states she works as a      Allergies:  Patient has no known allergies.    Current Medications / CAM's:    Current Outpatient Medications:     alclometasone (Aclovate) 0.05 % cream, , Disp: , Rfl:     amLODIPine (Norvasc) 2.5 mg tablet, Take 1 tablet (2.5 mg) by mouth once daily., Disp: , Rfl:     atenolol (Tenormin) 50 mg tablet, TAKE 1 TABLET BY MOUTH EVERY DAY, Disp: 90 tablet, Rfl: 0    cholecalciferol (Vitamin D-3) 5,000 Units tablet, Take by  mouth., Disp: , Rfl:     clobetasol (Temovate) 0.05 % ointment, Apply twice daily to affected areas of legs (avoid face, groin, body folds) for 3-4 weeks, taper to twice daily on weekends only; repeat every 6-8 weeks as needed for flares, Disp: 60 g, Rfl: 1    fluocinonide (Lidex) 0.05 % ointment, Apply twice daily to affected areas of legs (avoid face, groin, body folds) for 2 weeks, Disp: 60 g, Rfl: 1    gentamicin (Garamycin) 0.1 % ointment, , Disp: , Rfl:     hydroCHLOROthiazide (HYDRODiuril) 25 mg tablet, TAKE 1 TABLET BY MOUTH EVERY DAY, Disp: 90 tablet, Rfl: 0    neomycin-bacitracin-polymyxin (Polysporin) ophthalmic ointment, , Disp: , Rfl:     triamcinolone (Kenalog) 0.025 % cream, Triamcinolone Acetonide 0.025 % External Cream  Refills: 0     Active, Disp: , Rfl:      Objective   Well appearing patient in no apparent distress; mood and affect are within normal limits.    A skin examination was performed including: Face, neck, and extremities. All findings within normal limits unless otherwise noted below.    Assessment/Plan   1. Hypertrophic lichen planus  Right Lower Leg - Anterior  On her bilateral anterior legs, there are multiple similar-appearing erythematous to purplish, scaly, hyperkeratotic, thickened papules and small plaques, including several larger 1.5-3 cm plaques.  On her right anterior leg, there are a few erythematous to purplish, slightly hyperkeratotic, thin plaques at the sites of the recently injected lesions.    Hypertrophic Lichen Planus - improved on right anterior leg s/p IL steroid injections, but with flare on left anterior leg; biopsy-proven diagnosis.  The potentially chronic and intermittently flaring nature of this condition, the occasional association of LP with Hepatitis C, and management options were discussed extensively with the patient in the office today.  Of note, given the occasional association of LP with Hepatitis C, she had a screening Hepatitis C antibody drawn,  which was negative.    In addition, we spent a great deal of time discussing various treatment options, including topical therapy, intra-lesional corticosteroid injections, UV phototherapy, and systemic therapy, such as with Methotrexate.  After discussing the risks, benefits, and side effects of each of these options at length, including possible permanent skin atrophy following IL steroid injection, the patient expressed understanding and wishes to undergo further IL steroid injection of several of the plaques on her right anterior leg and right dorsal foot in the office today and resume topical therapy.    Thus, we recommend IL steroid injection with Kenalog 20 mg/mL today and topical steroid therapy with Clobetasol 0.05% ointment, which the patient was instructed to apply twice daily to the affected areas of her legs (avoid face, groin, body folds) for the next 3-4 weeks, followed by taper to twice daily on weekends only for persistent areas and/or maintenance and moisturization with a recommended over-the-counter moisturizing cream, such as Eucerin, or Vaseline twice daily on weekdays; the patient may repeat treatment in a 3-4 week burst-and-taper fashion every 6-8 weeks as needed for future flares.  The risks, benefits, and side effects of this medication, including possible skin atrophy with overuse of topical steroids, were discussed.  Should the patient not notice significant improvement within the next 2-3 weeks following today's procedure or the lesion and/or symptoms recur or worsen at any time in the future, the patient was instructed to call our office to schedule a return visit for re-evaluation and possible further IL steroid injections if indicated at that time.  The patient expressed understanding, is in agreement with this plan, and wishes to proceed with the procedure today.    triamcinolone acetonide (Kenalog-40) injection 20 mg - Right Lower Leg - Anterior      Intralesional injection - Right  Lower Leg - Anterior  Intralesional Injection:   Consent:     Consent obtained:  Verbal    Consent given by:  Patient    Risks discussed:  Poor cosmetic result, pain, infection and bleeding    Alternatives discussed:  No treatment  Pre-Procedure Details:     Prep Type:  Isopropyl alcohol  Procedure Details:   Injection:  Triamcinolone  Outcome: patient tolerated procedure well  Post-procedure details: sterile dressing applied and wound care instructions given  Dressing type: bandage   Comments:  A total of 2 ml of 20 mg/mL Kenalog was injected into 2 lesions on the left anterior leg  Lot #: [id number]  Expiration: 4/2026    Follow Up In Dermatology - Right Lower Leg - Anterior    Related Procedures  Follow Up In Dermatology - Established Patient    Related Medications  clobetasol (Temovate) 0.05 % ointment  Apply twice daily to affected areas of legs (avoid face, groin, body folds) for 3-4 weeks, taper to twice daily on weekends only; repeat every 6-8 weeks as needed for flares    2. Xerosis cutis  Diffuse generalized dry, scaly skin.    Xerosis.  We emphasized the importance of dry, sensitive skin care, including the use of a mild soap, such as Dove, and frequent and aggressive moisturization, at least twice daily and immediately following showers or baths, with recommended over-the-counter moisturizing creams, such as Eucerin, Cetaphil, Cerave, or Aveeno, or Vaseline or Aquaphor ointments.        Maki Clements DO   Dermatology Resident, PGY-3       I saw and evaluated the patient. I personally obtained the key and critical portions of the history and physical exam or was physically present for key and critical portions performed by the resident/fellow. I reviewed the resident/fellow's documentation and discussed the patient with the resident/fellow. I agree with the resident/fellow's medical decision making as documented in the note.    Geoffrey Blackwood MD

## 2024-11-26 ENCOUNTER — LAB (OUTPATIENT)
Dept: LAB | Facility: LAB | Age: 74
End: 2024-11-26
Payer: MEDICARE

## 2024-11-26 ENCOUNTER — APPOINTMENT (OUTPATIENT)
Dept: PRIMARY CARE | Facility: CLINIC | Age: 74
End: 2024-11-26
Payer: MEDICARE

## 2024-11-26 VITALS
HEART RATE: 83 BPM | WEIGHT: 178 LBS | BODY MASS INDEX: 28.73 KG/M2 | SYSTOLIC BLOOD PRESSURE: 145 MMHG | DIASTOLIC BLOOD PRESSURE: 83 MMHG

## 2024-11-26 DIAGNOSIS — R79.9 ABNORMAL FINDING OF BLOOD CHEMISTRY, UNSPECIFIED: ICD-10-CM

## 2024-11-26 DIAGNOSIS — Z78.0 MENOPAUSE: ICD-10-CM

## 2024-11-26 DIAGNOSIS — E78.5 HYPERLIPIDEMIA, UNSPECIFIED HYPERLIPIDEMIA TYPE: ICD-10-CM

## 2024-11-26 DIAGNOSIS — Z00.00 HEALTH CARE MAINTENANCE: ICD-10-CM

## 2024-11-26 DIAGNOSIS — R79.89 ELEVATED SERUM CREATININE: ICD-10-CM

## 2024-11-26 DIAGNOSIS — I10 HYPERTENSION, UNSPECIFIED TYPE: Primary | ICD-10-CM

## 2024-11-26 DIAGNOSIS — Z12.31 ENCOUNTER FOR SCREENING MAMMOGRAM FOR MALIGNANT NEOPLASM OF BREAST: ICD-10-CM

## 2024-11-26 LAB — TSH SERPL-ACNC: 2.04 MIU/L (ref 0.44–3.98)

## 2024-11-26 PROCEDURE — 80061 LIPID PANEL: CPT

## 2024-11-26 PROCEDURE — 99214 OFFICE O/P EST MOD 30 MIN: CPT | Performed by: STUDENT IN AN ORGANIZED HEALTH CARE EDUCATION/TRAINING PROGRAM

## 2024-11-26 PROCEDURE — 80053 COMPREHEN METABOLIC PANEL: CPT

## 2024-11-26 PROCEDURE — 3077F SYST BP >= 140 MM HG: CPT | Performed by: STUDENT IN AN ORGANIZED HEALTH CARE EDUCATION/TRAINING PROGRAM

## 2024-11-26 PROCEDURE — 85027 COMPLETE CBC AUTOMATED: CPT

## 2024-11-26 PROCEDURE — 83036 HEMOGLOBIN GLYCOSYLATED A1C: CPT

## 2024-11-26 PROCEDURE — 84443 ASSAY THYROID STIM HORMONE: CPT

## 2024-11-26 PROCEDURE — 3079F DIAST BP 80-89 MM HG: CPT | Performed by: STUDENT IN AN ORGANIZED HEALTH CARE EDUCATION/TRAINING PROGRAM

## 2024-11-26 PROCEDURE — 36415 COLL VENOUS BLD VENIPUNCTURE: CPT

## 2024-11-26 ASSESSMENT — ENCOUNTER SYMPTOMS
VOMITING: 0
WEAKNESS: 0
SPEECH DIFFICULTY: 0
NAUSEA: 0
SHORTNESS OF BREATH: 0
NUMBNESS: 0
COUGH: 0
DIZZINESS: 0
WHEEZING: 0
FEVER: 0
ABDOMINAL PAIN: 0
CONSTIPATION: 0
ACTIVITY CHANGE: 0
HEMATURIA: 0
DYSURIA: 0

## 2024-11-26 NOTE — PROGRESS NOTES
Subjective   Patient ID: Maite Loya is a 74 y.o. female who presents for No chief complaint on file..  HPI  Maite Loya is 74 y.o. is here to establish care  Former patient of Dr Khoury       Chronic active medical problems   hyperlipidemia  Hypertension- amlo 2.5; atenolol 50 ( heart rhythm abnormalities: was placed on a heart monitor; in [place]; 2007 ), hydrochlorothiazide 25  Transient amnesia: in [place] in 2022- lasted about half hour; no stroke   Lichen planus- treated by derm     Other medical history in chart     FH   Htn: mom; gm; sister   DM; none   Cancer:   Mom- ? Back   Father: multiple cancers?      No concerns today.   Cancer screening  Hysterectomy    Colonoscopy- 2022- no specimens    Mammogram- ordered again ( orederd by prev PCP, not done )  Bone density ordered again ( orederd by prev PCP, not done )      Immunizations  Will get records from [facility] today   [] labs   [] mmg   [] bone density scan    .We will call the patient with abnormal labs if any and discuss necessary changes based on labs; otherwise patient to Follow up in 4-5 months for a review.   And for next physical exam     Past Medical History:   Diagnosis Date    Amnesia, global, transient 2022    Obesity, unspecified 10/02/2019    Obesity (BMI 30.0-34.9)      Past Surgical History:   Procedure Laterality Date    OTHER SURGICAL HISTORY  06/11/2021    Hysterectomy    OTHER SURGICAL HISTORY  06/11/2021    Tonsillectomy    OTHER SURGICAL HISTORY  06/11/2021    Hip replacement    TOTAL HIP ARTHROPLASTY Bilateral       Family History   Problem Relation Name Age of Onset    Hypertension Mother      Cancer Mother      Hypertension Father      Other (tumor) Father        No Known Allergies       Occupation:     Review of Systems   Constitutional:  Negative for activity change and fever.   HENT:  Negative for congestion.    Respiratory:  Negative for cough, shortness of breath and wheezing.    Cardiovascular:  Negative  for chest pain and leg swelling.   Gastrointestinal:  Negative for abdominal pain, constipation, nausea and vomiting.   Endocrine: Negative for cold intolerance.   Genitourinary:  Negative for dysuria, hematuria and urgency.   Neurological:  Negative for dizziness, speech difficulty, weakness and numbness.   Psychiatric/Behavioral:  Negative for self-injury and suicidal ideas.        Objective   Visit Vitals  /83   Pulse 83   Wt 80.7 kg (178 lb)   BMI 28.73 kg/m²   Smoking Status Never   BSA 1.94 m²      Physical Exam  Constitutional:       Appearance: Normal appearance.   HENT:      Head: Normocephalic and atraumatic.      Nose: Nose normal.      Mouth/Throat:      Mouth: Mucous membranes are moist.   Eyes:      Conjunctiva/sclera: Conjunctivae normal.      Pupils: Pupils are equal, round, and reactive to light.   Cardiovascular:      Rate and Rhythm: Normal rate and regular rhythm.      Pulses: Normal pulses.      Heart sounds: Normal heart sounds.   Pulmonary:      Effort: Pulmonary effort is normal.      Breath sounds: Normal breath sounds.   Musculoskeletal:         General: Normal range of motion.      Cervical back: Neck supple.   Skin:     General: Skin is warm.   Neurological:      General: No focal deficit present.      Mental Status: She is alert and oriented to person, place, and time.   Psychiatric:         Mood and Affect: Mood normal.         Behavior: Behavior normal.         Thought Content: Thought content normal.         Judgment: Judgment normal.         Assessment/Plan   Diagnoses and all orders for this visit:  Hypertension, unspecified type  Hyperlipidemia, unspecified hyperlipidemia type  -     Lipid Panel; Future  -     TSH with reflex to Free T4 if abnormal; Future  Elevated serum creatinine  -     Comprehensive Metabolic Panel; Future  Health care maintenance  -     Hemoglobin A1C; Future  -     CBC; Future  -     XR DEXA bone density; Future  -     BI mammo bilateral screening  tomosynthesis; Future  Abnormal finding of blood chemistry, unspecified  -     CBC; Future  Menopause  -     XR DEXA bone density; Future  Encounter for screening mammogram for malignant neoplasm of breast  -     BI mammo bilateral screening tomosynthesis; Future

## 2024-11-27 LAB
ALBUMIN SERPL BCP-MCNC: 4.8 G/DL (ref 3.4–5)
ALP SERPL-CCNC: 58 U/L (ref 33–136)
ALT SERPL W P-5'-P-CCNC: 12 U/L (ref 7–45)
ANION GAP SERPL CALC-SCNC: 15 MMOL/L (ref 10–20)
AST SERPL W P-5'-P-CCNC: 17 U/L (ref 9–39)
BILIRUB SERPL-MCNC: 0.8 MG/DL (ref 0–1.2)
BUN SERPL-MCNC: 20 MG/DL (ref 6–23)
CALCIUM SERPL-MCNC: 9.9 MG/DL (ref 8.6–10.6)
CHLORIDE SERPL-SCNC: 101 MMOL/L (ref 98–107)
CHOLEST SERPL-MCNC: 212 MG/DL (ref 0–199)
CHOLESTEROL/HDL RATIO: 3.5
CO2 SERPL-SCNC: 28 MMOL/L (ref 21–32)
CREAT SERPL-MCNC: 1.06 MG/DL (ref 0.5–1.05)
EGFRCR SERPLBLD CKD-EPI 2021: 55 ML/MIN/1.73M*2
ERYTHROCYTE [DISTWIDTH] IN BLOOD BY AUTOMATED COUNT: 14.7 % (ref 11.5–14.5)
GLUCOSE SERPL-MCNC: 78 MG/DL (ref 74–99)
HCT VFR BLD AUTO: 44.6 % (ref 36–46)
HDLC SERPL-MCNC: 60.6 MG/DL
HGB BLD-MCNC: 15.1 G/DL (ref 12–16)
LDLC SERPL CALC-MCNC: 131 MG/DL
MCH RBC QN AUTO: 28.2 PG (ref 26–34)
MCHC RBC AUTO-ENTMCNC: 33.9 G/DL (ref 32–36)
MCV RBC AUTO: 83 FL (ref 80–100)
NON HDL CHOLESTEROL: 151 MG/DL (ref 0–149)
NRBC BLD-RTO: 0 /100 WBCS (ref 0–0)
PLATELET # BLD AUTO: 257 X10*3/UL (ref 150–450)
POTASSIUM SERPL-SCNC: 3.9 MMOL/L (ref 3.5–5.3)
PROT SERPL-MCNC: 7.5 G/DL (ref 6.4–8.2)
RBC # BLD AUTO: 5.35 X10*6/UL (ref 4–5.2)
SODIUM SERPL-SCNC: 140 MMOL/L (ref 136–145)
TRIGL SERPL-MCNC: 101 MG/DL (ref 0–149)
VLDL: 20 MG/DL (ref 0–40)
WBC # BLD AUTO: 10.7 X10*3/UL (ref 4.4–11.3)

## 2024-11-28 LAB
EST. AVERAGE GLUCOSE BLD GHB EST-MCNC: 103 MG/DL
HBA1C MFR BLD: 5.2 %

## 2024-12-23 ENCOUNTER — TELEPHONE (OUTPATIENT)
Dept: DERMATOLOGY | Facility: CLINIC | Age: 74
End: 2024-12-23
Payer: MEDICARE

## 2024-12-23 NOTE — TELEPHONE ENCOUNTER
Pt would like if someone call call her to reschedule her appt she was recently given to see Dr Blackwood , forgot her  is having surgery on that day --02/03/25..

## 2025-01-09 DIAGNOSIS — I10 ESSENTIAL (PRIMARY) HYPERTENSION: ICD-10-CM

## 2025-01-10 DIAGNOSIS — I10 ESSENTIAL (PRIMARY) HYPERTENSION: ICD-10-CM

## 2025-01-10 RX ORDER — ATENOLOL 50 MG/1
50 TABLET ORAL DAILY
Qty: 90 TABLET | Refills: 0 | Status: SHIPPED | OUTPATIENT
Start: 2025-01-10

## 2025-01-10 RX ORDER — HYDROCHLOROTHIAZIDE 25 MG/1
25 TABLET ORAL DAILY
Qty: 90 TABLET | Refills: 0 | Status: SHIPPED | OUTPATIENT
Start: 2025-01-10

## 2025-01-13 RX ORDER — ATENOLOL 50 MG/1
50 TABLET ORAL DAILY
Qty: 90 TABLET | Refills: 0 | OUTPATIENT
Start: 2025-01-13

## 2025-01-13 RX ORDER — HYDROCHLOROTHIAZIDE 25 MG/1
25 TABLET ORAL DAILY
Qty: 90 TABLET | Refills: 0 | OUTPATIENT
Start: 2025-01-13

## 2025-02-03 ENCOUNTER — APPOINTMENT (OUTPATIENT)
Dept: DERMATOLOGY | Facility: CLINIC | Age: 75
End: 2025-02-03
Payer: MEDICARE

## 2025-02-10 ENCOUNTER — APPOINTMENT (OUTPATIENT)
Dept: DERMATOLOGY | Facility: CLINIC | Age: 75
End: 2025-02-10
Payer: MEDICARE

## 2025-02-28 DIAGNOSIS — I10 ESSENTIAL (PRIMARY) HYPERTENSION: ICD-10-CM

## 2025-02-28 RX ORDER — ATENOLOL 50 MG/1
50 TABLET ORAL DAILY
Qty: 90 TABLET | Refills: 0 | Status: SHIPPED | OUTPATIENT
Start: 2025-02-28

## 2025-02-28 RX ORDER — HYDROCHLOROTHIAZIDE 25 MG/1
25 TABLET ORAL DAILY
Qty: 90 TABLET | Refills: 0 | Status: SHIPPED | OUTPATIENT
Start: 2025-02-28

## 2025-04-16 ENCOUNTER — APPOINTMENT (OUTPATIENT)
Dept: PRIMARY CARE | Facility: CLINIC | Age: 75
End: 2025-04-16
Payer: MEDICARE

## 2025-04-16 VITALS
BODY MASS INDEX: 27.87 KG/M2 | OXYGEN SATURATION: 96 % | HEART RATE: 83 BPM | HEIGHT: 66 IN | WEIGHT: 173.4 LBS | DIASTOLIC BLOOD PRESSURE: 80 MMHG | SYSTOLIC BLOOD PRESSURE: 129 MMHG

## 2025-04-16 DIAGNOSIS — I10 HYPERTENSION, UNSPECIFIED TYPE: ICD-10-CM

## 2025-04-16 DIAGNOSIS — E78.5 HYPERLIPIDEMIA, UNSPECIFIED HYPERLIPIDEMIA TYPE: Primary | ICD-10-CM

## 2025-04-16 DIAGNOSIS — Z00.00 ROUTINE GENERAL MEDICAL EXAMINATION AT HEALTH CARE FACILITY: ICD-10-CM

## 2025-04-16 PROCEDURE — 3008F BODY MASS INDEX DOCD: CPT | Performed by: STUDENT IN AN ORGANIZED HEALTH CARE EDUCATION/TRAINING PROGRAM

## 2025-04-16 PROCEDURE — 1170F FXNL STATUS ASSESSED: CPT | Performed by: STUDENT IN AN ORGANIZED HEALTH CARE EDUCATION/TRAINING PROGRAM

## 2025-04-16 PROCEDURE — 1123F ACP DISCUSS/DSCN MKR DOCD: CPT | Performed by: STUDENT IN AN ORGANIZED HEALTH CARE EDUCATION/TRAINING PROGRAM

## 2025-04-16 PROCEDURE — 1036F TOBACCO NON-USER: CPT | Performed by: STUDENT IN AN ORGANIZED HEALTH CARE EDUCATION/TRAINING PROGRAM

## 2025-04-16 PROCEDURE — 93000 ELECTROCARDIOGRAM COMPLETE: CPT | Performed by: STUDENT IN AN ORGANIZED HEALTH CARE EDUCATION/TRAINING PROGRAM

## 2025-04-16 PROCEDURE — 3079F DIAST BP 80-89 MM HG: CPT | Performed by: STUDENT IN AN ORGANIZED HEALTH CARE EDUCATION/TRAINING PROGRAM

## 2025-04-16 PROCEDURE — G0439 PPPS, SUBSEQ VISIT: HCPCS | Performed by: STUDENT IN AN ORGANIZED HEALTH CARE EDUCATION/TRAINING PROGRAM

## 2025-04-16 PROCEDURE — 99214 OFFICE O/P EST MOD 30 MIN: CPT | Performed by: STUDENT IN AN ORGANIZED HEALTH CARE EDUCATION/TRAINING PROGRAM

## 2025-04-16 PROCEDURE — 1159F MED LIST DOCD IN RCRD: CPT | Performed by: STUDENT IN AN ORGANIZED HEALTH CARE EDUCATION/TRAINING PROGRAM

## 2025-04-16 PROCEDURE — 3074F SYST BP LT 130 MM HG: CPT | Performed by: STUDENT IN AN ORGANIZED HEALTH CARE EDUCATION/TRAINING PROGRAM

## 2025-04-16 ASSESSMENT — PATIENT HEALTH QUESTIONNAIRE - PHQ9
2. FEELING DOWN, DEPRESSED OR HOPELESS: NOT AT ALL
1. LITTLE INTEREST OR PLEASURE IN DOING THINGS: NOT AT ALL
SUM OF ALL RESPONSES TO PHQ9 QUESTIONS 1 AND 2: 0

## 2025-04-16 ASSESSMENT — ACTIVITIES OF DAILY LIVING (ADL)
GROCERY_SHOPPING: INDEPENDENT
BATHING: INDEPENDENT
TAKING_MEDICATION: INDEPENDENT
MANAGING_FINANCES: INDEPENDENT
DOING_HOUSEWORK: INDEPENDENT
DRESSING: INDEPENDENT

## 2025-04-16 ASSESSMENT — ENCOUNTER SYMPTOMS
FEVER: 0
OCCASIONAL FEELINGS OF UNSTEADINESS: 0
VOMITING: 0
ABDOMINAL PAIN: 0
ACTIVITY CHANGE: 0
CONSTIPATION: 0
SPEECH DIFFICULTY: 0
WEAKNESS: 0
DYSURIA: 0
SHORTNESS OF BREATH: 0
LOSS OF SENSATION IN FEET: 0
COUGH: 0
HEMATURIA: 0
NUMBNESS: 0
DIZZINESS: 0
DEPRESSION: 0
NAUSEA: 0
WHEEZING: 0

## 2025-04-16 NOTE — PROGRESS NOTES
"Subjective   Patient ID: Maite Loya is a 74 y.o. female who presents for Medicare Annual Wellness Visit Subsequent.  HPI  Patient is here for W; no concerns        Chronic active medical problems   hyperlipidemia  Hypertension- amlo 2.5; atenolol 50 ( heart rhythm abnormalities: was placed on a heart monitor; in [place]; 2007 ), hydrochlorothiazide 25  Transient amnesia: in [place] in 2022- lasted about half hour; no stroke   Lichen planus- treated by derm      Other medical history in chart      FH   Htn: mom; gm; sister   DM; none   Cancer:   Mom- ? Back   Father: multiple cancers?        No concerns today.   Cancer screening  Hysterectomy    Colonoscopy- 2022- no specimens    Mammogram- ordered again ( orederd , not done )  Bone density ordered again ( orederd , not done )  Immunizations  Will get records from [facility] to have received shingles and prevnar         Labs- CKD, HLD   Plan today  []labs   [] MMG, bone density   [] vaccc  [] EKG /ct cardiac scorin         Review of Systems   Constitutional:  Negative for activity change and fever.   HENT:  Negative for congestion.    Respiratory:  Negative for cough, shortness of breath and wheezing.    Cardiovascular:  Negative for chest pain and leg swelling.   Gastrointestinal:  Negative for abdominal pain, constipation, nausea and vomiting.   Endocrine: Negative for cold intolerance.   Genitourinary:  Negative for dysuria, hematuria and urgency.   Neurological:  Negative for dizziness, speech difficulty, weakness and numbness.   Psychiatric/Behavioral:  Negative for self-injury and suicidal ideas.        Objective   Visit Vitals  /80 (BP Location: Left arm, Patient Position: Sitting, BP Cuff Size: Adult)   Pulse 83   Ht 1.676 m (5' 6\")   Wt 78.7 kg (173 lb 6.4 oz)   SpO2 96%   BMI 27.99 kg/m²   OB Status Hysterectomy   Smoking Status Never   BSA 1.91 m²      Physical Exam  HENT:      Head: Normocephalic and atraumatic.      Right Ear: " Tympanic membrane normal.      Left Ear: Tympanic membrane normal.      Nose: Nose normal.      Mouth/Throat:      Mouth: Mucous membranes are moist.   Eyes:      Extraocular Movements: Extraocular movements intact.      Conjunctiva/sclera: Conjunctivae normal.      Pupils: Pupils are equal, round, and reactive to light.   Cardiovascular:      Rate and Rhythm: Normal rate and regular rhythm.      Pulses: Normal pulses.      Heart sounds: Normal heart sounds.   Pulmonary:      Effort: Pulmonary effort is normal.      Breath sounds: Normal breath sounds. No stridor. No rhonchi.   Abdominal:      General: Bowel sounds are normal.      Palpations: Abdomen is soft.      Tenderness: There is no abdominal tenderness. There is no guarding or rebound.   Musculoskeletal:      Cervical back: Neck supple.   Neurological:      Mental Status: She is oriented to person, place, and time.   Psychiatric:         Mood and Affect: Mood normal.         Behavior: Behavior normal.         Assessment/Plan   Diagnoses and all orders for this visit:  Hyperlipidemia, unspecified hyperlipidemia type  -     Lipid Panel; Future  -     CT cardiac scoring wo IV contrast; Future  -     ECG 12 Lead  Hypertension, unspecified type  -     CBC  -     Comprehensive Metabolic Panel; Future  Routine general medical examination at health care facility  -     1 Year Follow Up In [facility] - PCP - Wellness Exam; Future

## 2025-04-17 ENCOUNTER — APPOINTMENT (OUTPATIENT)
Dept: DERMATOLOGY | Facility: CLINIC | Age: 75
End: 2025-04-17
Payer: MEDICARE

## 2025-04-17 DIAGNOSIS — L43.0 HYPERTROPHIC LICHEN PLANUS: Primary | ICD-10-CM

## 2025-04-17 LAB
ALBUMIN SERPL-MCNC: 4.6 G/DL (ref 3.6–5.1)
ALP SERPL-CCNC: 57 U/L (ref 37–153)
ALT SERPL-CCNC: 12 U/L (ref 6–29)
ANION GAP SERPL CALCULATED.4IONS-SCNC: 13 MMOL/L (CALC) (ref 7–17)
AST SERPL-CCNC: 16 U/L (ref 10–35)
BILIRUB SERPL-MCNC: 0.7 MG/DL (ref 0.2–1.2)
BUN SERPL-MCNC: 16 MG/DL (ref 7–25)
CALCIUM SERPL-MCNC: 9.9 MG/DL (ref 8.6–10.4)
CHLORIDE SERPL-SCNC: 103 MMOL/L (ref 98–110)
CHOLEST SERPL-MCNC: 206 MG/DL
CHOLEST/HDLC SERPL: 3.6 (CALC)
CO2 SERPL-SCNC: 26 MMOL/L (ref 20–32)
CREAT SERPL-MCNC: 1.01 MG/DL (ref 0.6–1)
EGFRCR SERPLBLD CKD-EPI 2021: 58 ML/MIN/1.73M2
ERYTHROCYTE [DISTWIDTH] IN BLOOD BY AUTOMATED COUNT: 15.4 % (ref 11–15)
GLUCOSE SERPL-MCNC: 87 MG/DL (ref 65–99)
HCT VFR BLD AUTO: 46.5 % (ref 35–45)
HDLC SERPL-MCNC: 58 MG/DL
HGB BLD-MCNC: 15.4 G/DL (ref 11.7–15.5)
LDLC SERPL CALC-MCNC: 127 MG/DL (CALC)
MCH RBC QN AUTO: 28.5 PG (ref 27–33)
MCHC RBC AUTO-ENTMCNC: 33.1 G/DL (ref 32–36)
MCV RBC AUTO: 86.1 FL (ref 80–100)
NONHDLC SERPL-MCNC: 148 MG/DL (CALC)
PLATELET # BLD AUTO: 252 THOUSAND/UL (ref 140–400)
PMV BLD REES-ECKER: 11.1 FL (ref 7.5–12.5)
POTASSIUM SERPL-SCNC: 4.2 MMOL/L (ref 3.5–5.3)
PROT SERPL-MCNC: 7.2 G/DL (ref 6.1–8.1)
RBC # BLD AUTO: 5.4 MILLION/UL (ref 3.8–5.1)
SODIUM SERPL-SCNC: 142 MMOL/L (ref 135–146)
TRIGL SERPL-MCNC: 105 MG/DL
WBC # BLD AUTO: 8.4 THOUSAND/UL (ref 3.8–10.8)

## 2025-04-17 PROCEDURE — 11901 INJECT SKIN LESIONS >7: CPT | Performed by: DERMATOLOGY

## 2025-04-17 PROCEDURE — 1123F ACP DISCUSS/DSCN MKR DOCD: CPT | Performed by: DERMATOLOGY

## 2025-04-17 PROCEDURE — 99214 OFFICE O/P EST MOD 30 MIN: CPT | Performed by: DERMATOLOGY

## 2025-04-17 PROCEDURE — 1159F MED LIST DOCD IN RCRD: CPT | Performed by: DERMATOLOGY

## 2025-04-17 RX ORDER — TRIAMCINOLONE ACETONIDE 40 MG/ML
80 INJECTION, SUSPENSION INTRA-ARTICULAR; INTRAMUSCULAR ONCE
Status: COMPLETED | OUTPATIENT
Start: 2025-04-17 | End: 2025-04-17

## 2025-04-17 RX ADMIN — TRIAMCINOLONE ACETONIDE 80 MG: 40 INJECTION, SUSPENSION INTRA-ARTICULAR; INTRAMUSCULAR at 21:23

## 2025-04-17 ASSESSMENT — DERMATOLOGY PATIENT ASSESSMENT
ARE YOU TRYING TO GET PREGNANT: NO
DO YOU USE A TANNING BED: NO
DO YOU USE SUNSCREEN: OCCASIONALLY
DO YOU HAVE IRREGULAR MENSTRUAL CYCLES: NO
ARE YOU ON BIRTH CONTROL: NO
DO YOU HAVE ANY NEW OR CHANGING LESIONS: NO

## 2025-04-17 ASSESSMENT — ITCH NUMERIC RATING SCALE: HOW SEVERE IS YOUR ITCHING?: 0

## 2025-04-17 ASSESSMENT — DERMATOLOGY QUALITY OF LIFE (QOL) ASSESSMENT: ARE THERE EXCLUSIONS OR EXCEPTIONS FOR THE QUALITY OF LIFE ASSESSMENT: NO

## 2025-04-17 NOTE — Clinical Note
Hypertrophic Lichen Planus - improved on bilateral anterior legs s/p IL steroid injections, but with flare on left anterior leg; biopsy-proven diagnosis.  The potentially chronic and intermittently flaring nature of this condition, the occasional association of LP with Hepatitis C, and management options were discussed extensively with the patient in the office today.  Of note, given the occasional association of LP with Hepatitis C, she had a screening Hepatitis C antibody drawn, which was negative.    In addition, we spent a great deal of time discussing various treatment options, including topical therapy, intra-lesional corticosteroid injections, UV phototherapy, and systemic therapy, such as with Methotrexate.  After discussing the risks, benefits, and side effects of each of these options at length, including possible permanent skin atrophy following IL steroid injection, the patient expressed understanding and wishes to undergo further IL steroid injection of several of the plaques on her left anterior leg, resume topical therapy, and begin UV phototherapy.    Thus, I recommend initiation of narrowband-UVB phototherapy 3 times weekly, further IL steroid injections with Kenalog 20 mg/mL today, and topical steroid therapy with Clobetasol 0.05% ointment, which the patient was instructed to apply twice daily to the affected areas of her legs (avoid face, groin, body folds) for the next 3-4 weeks, followed by taper to twice daily on weekends only for persistent areas and/or maintenance and moisturization with a recommended over-the-counter moisturizing cream, such as Eucerin, or Vaseline twice daily on weekdays; the patient may repeat treatment in a 3-4 week burst-and-taper fashion every 6-8 weeks as needed for future flares.  The risks, benefits, and side effects of this medication, including possible skin atrophy with overuse of topical steroids, were discussed.  She was instructed to return to our office in 1-2  months for re-evaluation and possible further IL steroid injections if indicated at that time.  The patient expressed understanding, is in agreement with this plan, and wishes to proceed with the procedure today.

## 2025-04-17 NOTE — Clinical Note
On her bilateral anterior legs, there are multiple similar-appearing erythematous to purplish, scaly, hyperkeratotic, thickened papules and small plaques, including several larger 1.5-3 cm plaques.  On her bilateral anterior leg, more on the right than the left, there are a few erythematous to purplish, slightly hyperkeratotic, thin plaques at the sites of the recently injected lesions.

## 2025-04-18 NOTE — PROGRESS NOTES
Subjective     Maite Loya is a 74 y.o. female who presents for the following: Follow-up.  She was last seen in our office on 11/18/24, at which time she underwent IL steroid injection with Kenalog 20 mg/mL for Hypertrophic Lichen Planus on her left leg.    Today, the patient reports significant improvement in the lesions on her left leg following IL steroid injections performed at her last visit, and they became less raised and less itchy.  She reports she has been putting clobetasol 0.05% ointment on the lesions occasionally, but not on a regular basis.  She would like to have more of the lesions on her left leg treated today if possible, because they have become very raised and itchy, especially when they rub on her pants.  She denies any other new, changing, or concerning skin lesions since her last visit; no bleeding, itching, or burning lesions.      Review of Systems:  No other skin or systemic complaints other than what is documented elsewhere in the note.    The following portions of the chart were reviewed this encounter and updated as appropriate:       Skin Cancer History  Biopsy Log Book  No skin cancers from Specimen Tracking.    Additional History      Specialty Problems    None      Past Dermatologic / Past Relevant Medical History:    - biopsy-proven Hypertrophic Lichen Planus diagnosed on 5/16/24  - no history of eczema, psoriasis, or hepatitis C    Family History:    No family history of eczema, psoriasis, or lichen planus    Social History:    The patient states she works as a      Allergies:  Patient has no known allergies.    Current Medications / CAM's:  Current Medications[1]     Objective   Well appearing patient in no apparent distress; mood and affect are within normal limits.    A skin examination was performed including: Face, neck, and extremities. All findings within normal limits unless otherwise noted below.    Assessment/Plan   Skin Exam  1. HYPERTROPHIC LICHEN  PLANUS  Right Lower Leg - Anterior  On her bilateral anterior legs, there are multiple similar-appearing erythematous to purplish, scaly, hyperkeratotic, thickened papules and small plaques, including several larger 1.5-3 cm plaques.  On her bilateral anterior leg, more on the right than the left, there are a few erythematous to purplish, slightly hyperkeratotic, thin plaques at the sites of the recently injected lesions.  Hypertrophic Lichen Planus - improved on bilateral anterior legs s/p IL steroid injections, but with flare on left anterior leg; biopsy-proven diagnosis.  The potentially chronic and intermittently flaring nature of this condition, the occasional association of LP with Hepatitis C, and management options were discussed extensively with the patient in the office today.  Of note, given the occasional association of LP with Hepatitis C, she had a screening Hepatitis C antibody drawn, which was negative.    In addition, we spent a great deal of time discussing various treatment options, including topical therapy, intra-lesional corticosteroid injections, UV phototherapy, and systemic therapy, such as with Methotrexate.  After discussing the risks, benefits, and side effects of each of these options at length, including possible permanent skin atrophy following IL steroid injection, the patient expressed understanding and wishes to undergo further IL steroid injection of several of the plaques on her left anterior leg, resume topical therapy, and begin UV phototherapy.    Thus, I recommend initiation of narrowband-UVB phototherapy 3 times weekly, further IL steroid injections with Kenalog 20 mg/mL today, and topical steroid therapy with Clobetasol 0.05% ointment, which the patient was instructed to apply twice daily to the affected areas of her legs (avoid face, groin, body folds) for the next 3-4 weeks, followed by taper to twice daily on weekends only for persistent areas and/or maintenance and  moisturization with a recommended over-the-counter moisturizing cream, such as Eucerin, or Vaseline twice daily on weekdays; the patient may repeat treatment in a 3-4 week burst-and-taper fashion every 6-8 weeks as needed for future flares.  The risks, benefits, and side effects of this medication, including possible skin atrophy with overuse of topical steroids, were discussed.  She was instructed to return to our office in 1-2 months for re-evaluation and possible further IL steroid injections if indicated at that time.  The patient expressed understanding, is in agreement with this plan, and wishes to proceed with the procedure today.  Intralesional injection - Right Lower Leg - Anterior  Intralesional Injection:   Consent:     Consent obtained:  Verbal    Consent given by:  Patient    Risks discussed:  Poor cosmetic result, pain, infection and bleeding    Alternatives discussed:  No treatment  Pre-Procedure Details:     Prep Type:  Isopropyl alcohol  Procedure Details:   Injection:  Triamcinolone  Outcome: patient tolerated procedure well  Post-procedure details: sterile dressing applied and wound care instructions given  Dressing type: bandage   Comments:  A total of 4 ml of 20 mg/mL Kenalog were injected into 12 lesion(s)  Lot #: [id number]  Expiration: 04/2026    triamcinolone acetonide (Kenalog-40) injection 80 mg - Right Lower Leg - Anterior      Phototherapy treatment - Right Lower Leg - Anterior  Related Medications  clobetasol (Temovate) 0.05 % ointment  Apply twice daily to affected areas of legs (avoid face, groin, body folds) for 3-4 weeks, taper to twice daily on weekends only; repeat every 6-8 weeks as needed for flares          [1]   Current Outpatient Medications:     alclometasone (Aclovate) 0.05 % cream, , Disp: , Rfl:     atenolol (Tenormin) 50 mg tablet, Take 1 tablet (50 mg) by mouth once daily., Disp: 90 tablet, Rfl: 0    cholecalciferol (Vitamin D-3) 5,000 Units tablet, Take by mouth., Disp: ,  Rfl:     clobetasol (Temovate) 0.05 % ointment, Apply twice daily to affected areas of legs (avoid face, groin, body folds) for 3-4 weeks, taper to twice daily on weekends only; repeat every 6-8 weeks as needed for flares, Disp: 60 g, Rfl: 1    fluocinonide (Lidex) 0.05 % ointment, Apply twice daily to affected areas of legs (avoid face, groin, body folds) for 2 weeks, Disp: 60 g, Rfl: 1    gentamicin (Garamycin) 0.1 % ointment, , Disp: , Rfl:     hydroCHLOROthiazide (HYDRODiuril) 25 mg tablet, Take 1 tablet (25 mg) by mouth once daily., Disp: 90 tablet, Rfl: 0    neomycin-bacitracin-polymyxin (Polysporin) ophthalmic ointment, , Disp: , Rfl:     triamcinolone (Kenalog) 0.025 % cream, Triamcinolone Acetonide 0.025 % External Cream  Refills: 0     Active, Disp: , Rfl:   No current facility-administered medications for this visit.

## 2025-05-27 ENCOUNTER — APPOINTMENT (OUTPATIENT)
Dept: DERMATOLOGY | Facility: CLINIC | Age: 75
End: 2025-05-27
Payer: MEDICARE

## 2025-06-02 ENCOUNTER — APPOINTMENT (OUTPATIENT)
Dept: DERMATOLOGY | Facility: CLINIC | Age: 75
End: 2025-06-02
Payer: MEDICARE

## 2025-06-02 DIAGNOSIS — L43.0 HYPERTROPHIC LICHEN PLANUS: ICD-10-CM

## 2025-06-02 PROCEDURE — 96900 ACTINOTHERAPY UV LIGHT: CPT | Performed by: DERMATOLOGY

## 2025-06-02 NOTE — PROGRESS NOTES
Phototherapy Procedure Note:    Daniela Loya is a 74 y.o. female who presents for the following: Phototherapy    Phototherapy - Narrow Band UVB  Diagnosis - (also add to Visit Dx): Lichen Planus, Hypertrophic - L43.0  Phototherapy order has been reviewed?: Yes  Treatment Date: 25  Patient  Verified: Confirmed patient date of birth.  Provider: Merced  [facility]  Treatment Number: 1  Schedule: Number of Treatment(s) per week : 3 times per week  Meter Reading in milliwatts: 3.91  UVB Dose Today mJ/Cm2 (millijoules): 350  UVB Time of Exposure Time in Minutes : Seconds: 1:29  Topical: None  Face and Eye Shielding: Goggles  Genital Shielding: Underwear  Chest Shielding: Long sleeved shirt  Other Shielding: Face  Treatment Comments: Patient oriented to gonzalez; Start at 350mj/cm2, increase 60mj/cm2, three times per week  NB UVB Dose Hold At mJ/Cm2: 350

## 2025-06-04 ENCOUNTER — CLINICAL SUPPORT (OUTPATIENT)
Dept: DERMATOLOGY | Facility: CLINIC | Age: 75
End: 2025-06-04
Payer: MEDICARE

## 2025-06-04 DIAGNOSIS — L43.0 HYPERTROPHIC LICHEN PLANUS: ICD-10-CM

## 2025-06-04 PROCEDURE — 96900 ACTINOTHERAPY UV LIGHT: CPT | Performed by: DERMATOLOGY

## 2025-06-04 NOTE — PROGRESS NOTES
Phototherapy Procedure Note:    Daniela Loya is a 74 y.o. female who presents for the following: Phototherapy    Phototherapy - Narrow Band UVB  Diagnosis - (also add to Visit Dx): Lichen Planus, Hypertrophic - L43.0  Phototherapy order has been reviewed?: Yes  Treatment Date: 25  Patient  Verified: Confirmed patient date of birth.  Provider: Jessica  [facility]: [facility]  Treatment Number: 2  Schedule: Number of Treatment(s) per week : 3 times per week  Meter Reading in milliwatts: 3.89  UVB Dose Today mJ/Cm2 (millijoules): 410  UVB Time of Exposure Time in Minutes : Seconds: 1:45  Topical: None  Face and Eye Shielding: Goggles  Genital Shielding: Underwear  Chest Shielding: Long sleeved shirt  Other Shielding: Face  Reaction-Redness/Erythema Grade: 0 - No erythema  NB UVB Reaction - Tenderness: No Tenderness  Treatment Comments: no c/o  NB UVB Dose Increased by mJ/Cm2: 60

## 2025-06-06 ENCOUNTER — CLINICAL SUPPORT (OUTPATIENT)
Dept: DERMATOLOGY | Facility: CLINIC | Age: 75
End: 2025-06-06
Payer: MEDICARE

## 2025-06-06 DIAGNOSIS — L43.0 HYPERTROPHIC LICHEN PLANUS: ICD-10-CM

## 2025-06-06 PROCEDURE — 96900 ACTINOTHERAPY UV LIGHT: CPT | Performed by: DERMATOLOGY

## 2025-06-06 NOTE — PROGRESS NOTES
Phototherapy Procedure Note:    Subjective   Maite Loya is a 74 y.o. female who presents for the following: Phototherapy    Phototherapy - Narrow Band UVB  Diagnosis - (also add to Visit Dx): Lichen Planus, Hypertrophic - L43.0  Phototherapy order has been reviewed?: Yes  Treatment Date: 25  Patient  Verified: Confirmed patient date of birth.  Provider: Merced  [facility]: [facility]  Treatment Number: 3  Schedule: Number of Treatment(s) per week : 3 times per week  Meter Reading in milliwatts: 3.89  UVB Dose Today mJ/Cm2 (millijoules): 410  UVB Time of Exposure Time in Minutes : Seconds: 1:45  Topical: None  Face and Eye Shielding: Goggles  Genital Shielding: Underwear  Chest Shielding: Long sleeved shirt  Other Shielding: Face  Reaction-Redness/Erythema Grade: 0 - No erythema  NB UVB Reaction - Tenderness: Slept Bad  Treatment Comments: patient originally said no complaints but then saidf she had experienced slight tenderness after the last treatment, but no burning  NB UVB Dose Hold At mJ/Cm2: 410

## 2025-06-09 ENCOUNTER — APPOINTMENT (OUTPATIENT)
Dept: DERMATOLOGY | Facility: CLINIC | Age: 75
End: 2025-06-09
Payer: MEDICARE

## 2025-06-09 DIAGNOSIS — L43.0 HYPERTROPHIC LICHEN PLANUS: ICD-10-CM

## 2025-06-09 PROCEDURE — 96900 ACTINOTHERAPY UV LIGHT: CPT | Performed by: DERMATOLOGY

## 2025-06-09 NOTE — PROGRESS NOTES
Phototherapy Procedure Note:    Subjective   Maite Loya is a 74 y.o. female who presents for the following: Phototherapy    Phototherapy - Narrow Band UVB  Diagnosis - (also add to Visit Dx): Lichen Planus, Hypertrophic - L43.0  Phototherapy order has been reviewed?: Yes  Treatment Date: 25  Patient  Verified: Confirmed patient date of birth.  Provider: Merced  [facility]  Treatment Number: 4  Schedule: Number of Treatment(s) per week : 3 times per week  Meter Reading in milliwatts: 3.89  UVB Dose Today mJ/Cm2 (millijoules): 470  UVB Time of Exposure Time in Minutes : Seconds: 2:00  Topical: None  Face and Eye Shielding: Goggles  Genital Shielding: Underwear  Chest Shielding: Long sleeved shirt  Other Shielding: Face  Reaction-Redness/Erythema Grade: 0 - No erythema  NB UVB Reaction - Tenderness: No Tenderness  Treatment Comments: no c/o; patient denied tenderness/redness after last treatment  NB UVB Dose Increased by mJ/Cm2: 60

## 2025-06-10 ENCOUNTER — TELEPHONE (OUTPATIENT)
Dept: PRIMARY CARE | Facility: CLINIC | Age: 75
End: 2025-06-10

## 2025-06-11 ENCOUNTER — APPOINTMENT (OUTPATIENT)
Dept: DERMATOLOGY | Facility: CLINIC | Age: 75
End: 2025-06-11
Payer: MEDICARE

## 2025-06-11 DIAGNOSIS — L43.0 HYPERTROPHIC LICHEN PLANUS: ICD-10-CM

## 2025-06-11 PROCEDURE — 96900 ACTINOTHERAPY UV LIGHT: CPT | Performed by: DERMATOLOGY

## 2025-06-11 NOTE — PROGRESS NOTES
Phototherapy Procedure Note:    Daniela Loya is a 74 y.o. female who presents for the following: Phototherapy    Phototherapy - Narrow Band UVB  Diagnosis - (also add to Visit Dx): Lichen Planus, Hypertrophic - L43.0  Phototherapy order has been reviewed?: Yes  Treatment Date: 25  Patient  Verified: Confirmed patient date of birth.  Provider: diane  [facility]  Treatment Number: 5  Schedule: Number of Treatment(s) per week : 3 times per week  Meter Reading in milliwatts: 3.92  UVB Dose Today mJ/Cm2 (millijoules): 530  UVB Time of Exposure Time in Minutes : Seconds: 2:15  Topical: None  Face and Eye Shielding: Goggles  Genital Shielding: Underwear  Chest Shielding: Long sleeved shirt  Other Shielding: Face  Reaction-Redness/Erythema Grade: 0 - No erythema  NB UVB Reaction - Tenderness: No Tenderness  Treatment Comments: no c/o  NB UVB Dose Increased by mJ/Cm2: 60

## 2025-06-13 ENCOUNTER — APPOINTMENT (OUTPATIENT)
Dept: DERMATOLOGY | Facility: CLINIC | Age: 75
End: 2025-06-13
Payer: MEDICARE

## 2025-06-13 ENCOUNTER — CLINICAL SUPPORT (OUTPATIENT)
Dept: DERMATOLOGY | Facility: CLINIC | Age: 75
End: 2025-06-13
Payer: MEDICARE

## 2025-06-13 DIAGNOSIS — L43.0 LICHEN PLANUS HYPERTROPHICUS: ICD-10-CM

## 2025-06-13 PROCEDURE — 96900 ACTINOTHERAPY UV LIGHT: CPT | Performed by: DERMATOLOGY

## 2025-06-13 NOTE — PROGRESS NOTES
Phototherapy Procedure Note:    Daniela Loya is a 74 y.o. female who presents for the following: Phototherapy    Phototherapy - Narrow Band UVB  Diagnosis - (also add to Visit Dx): Lichen Planus, Hypertrophic - L43.0  Phototherapy order has been reviewed?: Yes  Treatment Date: 25  Patient  Verified: Confirmed patient date of birth.  Provider: Merced  [facility]  Treatment Number: 6  Schedule: Number of Treatment(s) per week : 3 times per week  Meter Reading in milliwatts: 3.89  UVB Dose Today mJ/Cm2 (millijoules): 590  UVB Time of Exposure Time in Minutes : Seconds: 2:31  Topical: None  Face and Eye Shielding: Goggles  Genital Shielding: Underwear  Chest Shielding: Long sleeved shirt  Other Shielding: Face  Reaction-Redness/Erythema Grade: 0 - No erythema  NB UVB Reaction - Tenderness: No Tenderness  Treatment Comments: no c/o  NB UVB Dose Increased by mJ/Cm2: 60

## 2025-06-16 ENCOUNTER — APPOINTMENT (OUTPATIENT)
Dept: DERMATOLOGY | Facility: CLINIC | Age: 75
End: 2025-06-16
Payer: MEDICARE

## 2025-06-16 DIAGNOSIS — L43.0 HYPERTROPHIC LICHEN PLANUS: ICD-10-CM

## 2025-06-16 PROCEDURE — 96900 ACTINOTHERAPY UV LIGHT: CPT | Performed by: DERMATOLOGY

## 2025-06-16 NOTE — PROGRESS NOTES
Phototherapy Procedure Note:    Daniela Loya is a 74 y.o. female who presents for the following: Phototherapy    Phototherapy - Narrow Band UVB  Diagnosis - (also add to Visit Dx): Lichen Planus, Hypertrophic - L43.0  Phototherapy order has been reviewed?: Yes  Treatment Date: 25  Patient  Verified: Confirmed patient date of birth.  Provider: Merced  [facility]  Treatment Number: 7  Schedule: Number of Treatment(s) per week : 3 times per week  Meter Reading in milliwatts: 3.84  UVB Dose Today mJ/Cm2 (millijoules): 650  UVB Time of Exposure Time in Minutes : Seconds: 2:49  Topical: None  Face and Eye Shielding: Goggles  Genital Shielding: Underwear  Chest Shielding: Long sleeved shirt  Other Shielding: Face  Reaction-Redness/Erythema Grade: 0 - No erythema  NB UVB Reaction - Tenderness: No Tenderness  Treatment Comments: no c/o  NB UVB Dose Increased by mJ/Cm2: 60

## 2025-06-18 ENCOUNTER — APPOINTMENT (OUTPATIENT)
Dept: DERMATOLOGY | Facility: CLINIC | Age: 75
End: 2025-06-18
Payer: MEDICARE

## 2025-06-20 ENCOUNTER — APPOINTMENT (OUTPATIENT)
Dept: DERMATOLOGY | Facility: CLINIC | Age: 75
End: 2025-06-20
Payer: MEDICARE

## 2025-06-20 DIAGNOSIS — L43.0 HYPERTROPHIC LICHEN PLANUS: ICD-10-CM

## 2025-06-20 PROCEDURE — 96900 ACTINOTHERAPY UV LIGHT: CPT | Performed by: DERMATOLOGY

## 2025-06-20 NOTE — PROGRESS NOTES
Phototherapy Procedure Note:    Daniela Loya is a 74 y.o. female who presents for the following: Phototherapy    Phototherapy - Narrow Band UVB  Diagnosis - (also add to Visit Dx): Lichen Planus, Hypertrophic - L43.0  Phototherapy order has been reviewed?: Yes  Treatment Date: 25  Patient  Verified: Confirmed patient date of birth.  Provider: Merced  [facility]  Treatment Number: 8  Schedule: Number of Treatment(s) per week : 3 times per week  Meter Reading in milliwatts: 3.91  UVB Dose Today mJ/Cm2 (millijoules): 590  UVB Time of Exposure Time in Minutes : Seconds: 2:31  Topical: None  Face and Eye Shielding: Goggles  Genital Shielding: Underwear  Chest Shielding: Long sleeved shirt  Other Shielding: Face  Reaction-Redness/Erythema Grade: 0 - No erythema  NB UVB Reaction - Tenderness: No Tenderness  Treatment Comments: patient had redness/tenderness on wednesday, was not treated 25, redness/tenderness has since resolved, so patient treated at 590mj today  NB UVB Dose Decreased by mJ/Cm2: 60

## 2025-06-30 DIAGNOSIS — I10 ESSENTIAL (PRIMARY) HYPERTENSION: ICD-10-CM

## 2025-07-01 RX ORDER — ATENOLOL 50 MG/1
50 TABLET ORAL DAILY
Qty: 90 TABLET | Refills: 0 | Status: SHIPPED | OUTPATIENT
Start: 2025-07-01

## 2025-07-01 RX ORDER — HYDROCHLOROTHIAZIDE 25 MG/1
25 TABLET ORAL DAILY
Qty: 90 TABLET | Refills: 0 | Status: SHIPPED | OUTPATIENT
Start: 2025-07-01

## 2025-07-07 ENCOUNTER — APPOINTMENT (OUTPATIENT)
Dept: DERMATOLOGY | Facility: CLINIC | Age: 75
End: 2025-07-07
Payer: MEDICARE

## 2025-07-07 DIAGNOSIS — L43.0 HYPERTROPHIC LICHEN PLANUS: ICD-10-CM

## 2025-07-07 PROCEDURE — 96900 ACTINOTHERAPY UV LIGHT: CPT | Performed by: STUDENT IN AN ORGANIZED HEALTH CARE EDUCATION/TRAINING PROGRAM

## 2025-07-07 NOTE — PROGRESS NOTES
Phototherapy Procedure Note:    Daniela Loya is a 75 y.o. female who presents for the following: Phototherapy    Phototherapy - Narrow Band UVB  Diagnosis - (also add to Visit Dx): Lichen Planus, Hypertrophic - L43.0  Phototherapy order has been reviewed?: Yes  Treatment Date: 25  Patient  Verified: Confirmed patient date of birth.  Provider: Evans  [facility]  Treatment Number: 9  Schedule: Number of Treatment(s) per week : 3 times per week  Meter Reading in milliwatts: 3.74  UVB Dose Today mJ/Cm2 (millijoules): 443  UVB Time of Exposure Time in Minutes : Seconds: 1:58  Topical: None  Face and Eye Shielding: Goggles  Genital Shielding: Underwear  Chest Shielding: Long sleeved shirt  Other Shielding: Face  Reaction-Redness/Erythema Grade: 0 - No erythema  NB UVB Reaction - Tenderness: No Tenderness  Treatment Comments: no c/o; missed treatment, 75% of last dose per protocol

## 2025-07-09 ENCOUNTER — APPOINTMENT (OUTPATIENT)
Dept: DERMATOLOGY | Facility: CLINIC | Age: 75
End: 2025-07-09
Payer: MEDICARE

## 2025-07-09 DIAGNOSIS — L43.0 HYPERTROPHIC LICHEN PLANUS: ICD-10-CM

## 2025-07-09 PROCEDURE — 96900 ACTINOTHERAPY UV LIGHT: CPT | Performed by: DERMATOLOGY

## 2025-07-09 NOTE — PROGRESS NOTES
Phototherapy Procedure Note:    Daniela Loya is a 75 y.o. female who presents for the following: Phototherapy    Phototherapy - Narrow Band UVB  Diagnosis - (also add to Visit Dx): Lichen Planus, Hypertrophic - L43.0  Phototherapy order has been reviewed?: Yes  Treatment Date: 25  Patient  Verified: Confirmed patient date of birth.  Provider: Jessica  [facility]  Treatment Number: 10  Schedule: Number of Treatment(s) per week : 3 times per week  Meter Reading in milliwatts: 3.81  UVB Dose Today mJ/Cm2 (millijoules): 443  UVB Time of Exposure Time in Minutes : Seconds: 1:56  Topical: None  Face and Eye Shielding: Goggles  Genital Shielding: Underwear  Chest Shielding: Long sleeved shirt  Other Shielding: Face  Reaction-Redness/Erythema Grade: 0 - No erythema  NB UVB Reaction - Tenderness: No Tenderness  Treatment Comments: Pt c/o tenderness, now resolved; repeat prior dose per protocol  NB UVB Dose Hold At mJ/Cm2: 443

## 2025-07-11 ENCOUNTER — APPOINTMENT (OUTPATIENT)
Dept: DERMATOLOGY | Facility: CLINIC | Age: 75
End: 2025-07-11
Payer: MEDICARE

## 2025-07-11 DIAGNOSIS — L43.0 HYPERTROPHIC LICHEN PLANUS: ICD-10-CM

## 2025-07-11 PROCEDURE — 96900 ACTINOTHERAPY UV LIGHT: CPT | Performed by: DERMATOLOGY

## 2025-07-14 ENCOUNTER — APPOINTMENT (OUTPATIENT)
Dept: DERMATOLOGY | Facility: CLINIC | Age: 75
End: 2025-07-14
Payer: MEDICARE

## 2025-07-14 DIAGNOSIS — L43.0 HYPERTROPHIC LICHEN PLANUS: ICD-10-CM

## 2025-07-14 PROCEDURE — 96900 ACTINOTHERAPY UV LIGHT: CPT | Performed by: DERMATOLOGY

## 2025-07-14 NOTE — PROGRESS NOTES
Phototherapy Procedure Note:    Daniela Loya is a 75 y.o. female who presents for the following: Phototherapy    Phototherapy - Narrow Band UVB  Diagnosis - (also add to Visit Dx): Lichen Planus, Hypertrophic - L43.0  Phototherapy order has been reviewed?: Yes  Treatment Date: 25  Patient  Verified: Confirmed patient date of birth.  Provider: Merced  [facility]  Treatment Number: 12  Schedule: Number of Treatment(s) per week : 3 times per week  Meter Reading in milliwatts: 3.78  UVB Dose Today mJ/Cm2 (millijoules): 503  UVB Time of Exposure Time in Minutes : Seconds: 2:12  Topical: None  Face and Eye Shielding: Goggles  Genital Shielding: Underwear  Chest Shielding: Long sleeved shirt  Other Shielding: Face  Reaction-Redness/Erythema Grade: 0 - No erythema  NB UVB Reaction - Tenderness:  (Patient c/o slight tenderness/itching with no redness)  Treatment Comments: patient c/o slight tenderness, no redness; repeat prior dose

## 2025-07-16 ENCOUNTER — APPOINTMENT (OUTPATIENT)
Dept: DERMATOLOGY | Facility: CLINIC | Age: 75
End: 2025-07-16
Payer: MEDICARE

## 2025-07-16 DIAGNOSIS — L43.0 HYPERTROPHIC LICHEN PLANUS: ICD-10-CM

## 2025-07-16 PROCEDURE — 96900 ACTINOTHERAPY UV LIGHT: CPT | Performed by: DERMATOLOGY

## 2025-07-16 NOTE — PROGRESS NOTES
Phototherapy Procedure Note:    Daniela Loya is a 75 y.o. female who presents for the following: Phototherapy    Phototherapy - Narrow Band UVB  Diagnosis - (also add to Visit Dx): Lichen Planus, Hypertrophic - L43.0  Phototherapy order has been reviewed?: Yes  Treatment Date: 25  Patient  Verified: Confirmed patient date of birth.  Provider: Martín  [facility]  Treatment Number: 13  Schedule: Number of Treatment(s) per week : 3 times per week  Meter Reading in milliwatts: 3.79  UVB Dose Today mJ/Cm2 (millijoules): 563  UVB Time of Exposure Time in Minutes : Seconds: 2:28  Topical: None  Face and Eye Shielding: Goggles  Genital Shielding: Underwear  Chest Shielding: Long sleeved shirt  Other Shielding: Face  Reaction-Redness/Erythema Grade: 0 - No erythema  NB UVB Reaction - Tenderness: No Tenderness  Treatment Comments: no c/o  NB UVB Dose Increased by mJ/Cm2: 60

## 2025-07-18 ENCOUNTER — APPOINTMENT (OUTPATIENT)
Dept: DERMATOLOGY | Facility: CLINIC | Age: 75
End: 2025-07-18
Payer: MEDICARE

## 2025-07-18 DIAGNOSIS — L43.0 HYPERTROPHIC LICHEN PLANUS: ICD-10-CM

## 2025-07-18 PROCEDURE — 96900 ACTINOTHERAPY UV LIGHT: CPT | Performed by: DERMATOLOGY

## 2025-07-18 NOTE — PROGRESS NOTES
Phototherapy Procedure Note:    Daniela Loya is a 75 y.o. female who presents for the following: Phototherapy    Phototherapy - Narrow Band UVB  Diagnosis - (also add to Visit Dx): Lichen Planus, Hypertrophic - L43.0  Phototherapy order has been reviewed?: Yes  Treatment Date: 25  Patient  Verified: Confirmed patient date of birth.  Provider: Merced  [facility]: [facility]  Treatment Number: 14  Schedule: Number of Treatment(s) per week : 3 times per week  Meter Reading in milliwatts: 3.83  UVB Dose Today mJ/Cm2 (millijoules): 623  UVB Time of Exposure Time in Minutes : Seconds: 2:42  Topical: None  Face and Eye Shielding: Goggles  Genital Shielding: Underwear  Chest Shielding: Long sleeved shirt  Other Shielding: Face  Reaction-Redness/Erythema Grade: 0 - No erythema  NB UVB Reaction - Tenderness: No Tenderness  Treatment Comments: no c/o  NB UVB Dose Increased by mJ/Cm2: 60

## 2025-07-21 ENCOUNTER — APPOINTMENT (OUTPATIENT)
Dept: DERMATOLOGY | Facility: CLINIC | Age: 75
End: 2025-07-21
Payer: MEDICARE

## 2025-07-21 DIAGNOSIS — L43.0 HYPERTROPHIC LICHEN PLANUS: ICD-10-CM

## 2025-07-21 PROCEDURE — 96900 ACTINOTHERAPY UV LIGHT: CPT | Performed by: DERMATOLOGY

## 2025-07-21 NOTE — PROGRESS NOTES
Phototherapy Procedure Note:    Daniela Loya is a 75 y.o. female who presents for the following: Phototherapy    Phototherapy - Narrow Band UVB  Diagnosis - (also add to Visit Dx): Lichen Planus, Hypertrophic - L43.0  Phototherapy order has been reviewed?: Yes  Treatment Date: 25  Patient  Verified: Confirmed patient date of birth.  Provider: Merced  [facility]  Treatment Number: 15  Schedule: Number of Treatment(s) per week : 3 times per week  Meter Reading in milliwatts: 3.79  UVB Dose Today mJ/Cm2 (millijoules): 623  UVB Time of Exposure Time in Minutes : Seconds: 2:44  Topical: None  Face and Eye Shielding: Goggles  Genital Shielding: Underwear  Chest Shielding: Long sleeved shirt  Other Shielding: Face  Reaction-Redness/Erythema Grade: 0 - No erythema  NB UVB Reaction - Tenderness: No Tenderness  Treatment Comments: no c/o  NB UVB Dose Increased by mJ/Cm2: 60

## 2025-07-23 ENCOUNTER — APPOINTMENT (OUTPATIENT)
Dept: DERMATOLOGY | Facility: CLINIC | Age: 75
End: 2025-07-23
Payer: MEDICARE

## 2025-07-23 DIAGNOSIS — L43.0 HYPERTROPHIC LICHEN PLANUS: ICD-10-CM

## 2025-07-23 PROCEDURE — 96900 ACTINOTHERAPY UV LIGHT: CPT | Performed by: DERMATOLOGY

## 2025-07-23 NOTE — PROGRESS NOTES
Phototherapy Procedure Note:    Daniela Loya is a 75 y.o. female who presents for the following: Phototherapy    Phototherapy - Narrow Band UVB  Diagnosis - (also add to Visit Dx): Lichen Planus, Hypertrophic - L43.0  Phototherapy order has been reviewed?: Yes  Treatment Date: 25  Patient  Verified: Confirmed patient date of birth.  Provider: Jessica  [facility]  Treatment Number: 16  Schedule: Number of Treatment(s) per week : 3 times per week  Meter Reading in milliwatts: 3.79  UVB Dose Today mJ/Cm2 (millijoules): 683  UVB Time of Exposure Time in Minutes : Seconds: 3:00  Topical: None  Face and Eye Shielding: Goggles  Genital Shielding: Underwear  Chest Shielding: Long sleeved shirt  Other Shielding: Face  Reaction-Redness/Erythema Grade: 0 - No erythema  NB UVB Reaction - Tenderness: No Tenderness  Treatment Comments: no c/o  NB UVB Dose Increased by mJ/Cm2: 60

## 2025-07-25 ENCOUNTER — APPOINTMENT (OUTPATIENT)
Dept: DERMATOLOGY | Facility: CLINIC | Age: 75
End: 2025-07-25
Payer: MEDICARE

## 2025-07-25 DIAGNOSIS — L43.0 HYPERTROPHIC LICHEN PLANUS: ICD-10-CM

## 2025-07-25 PROCEDURE — 96900 ACTINOTHERAPY UV LIGHT: CPT | Performed by: DERMATOLOGY

## 2025-07-25 NOTE — PROGRESS NOTES
Phototherapy Procedure Note:    Daniela Loya is a 75 y.o. female who presents for the following: Phototherapy    Phototherapy - Narrow Band UVB  Diagnosis - (also add to Visit Dx): Lichen Planus, Hypertrophic - L43.0  Phototherapy order has been reviewed?: Yes  Treatment Date: 25  Patient  Verified: Confirmed patient date of birth.  Provider: Merced  [facility]  Treatment Number: 17  Schedule: Number of Treatment(s) per week : 3 times per week  Meter Reading in milliwatts: 3.80  UVB Dose Today mJ/Cm2 (millijoules): 743  UVB Time of Exposure Time in Minutes : Seconds: 3:15  Topical: None  Face and Eye Shielding: Goggles  Genital Shielding: Underwear  Chest Shielding: Long sleeved shirt  Other Shielding: Face  Reaction-Redness/Erythema Grade: 0 - No erythema  NB UVB Reaction - Tenderness: No Tenderness  Treatment Comments: no c/o  NB UVB Dose Increased by mJ/Cm2: 60

## 2025-07-28 ENCOUNTER — APPOINTMENT (OUTPATIENT)
Dept: DERMATOLOGY | Facility: CLINIC | Age: 75
End: 2025-07-28
Payer: MEDICARE

## 2025-07-29 ENCOUNTER — TELEPHONE (OUTPATIENT)
Dept: DERMATOLOGY | Facility: CLINIC | Age: 75
End: 2025-07-29
Payer: MEDICARE

## 2025-07-29 NOTE — TELEPHONE ENCOUNTER
Pt is seen 3xs a wk for photo , and would like to see Dr Blackwood before continuing with any more treatments ?? Her appt with Dr BAR is 08/22/25 ,,,,,

## 2025-07-30 ENCOUNTER — APPOINTMENT (OUTPATIENT)
Dept: DERMATOLOGY | Facility: CLINIC | Age: 75
End: 2025-07-30
Payer: MEDICARE

## 2025-07-31 ENCOUNTER — TELEPHONE (OUTPATIENT)
Dept: DERMATOLOGY | Facility: CLINIC | Age: 75
End: 2025-07-31

## 2025-07-31 ENCOUNTER — OFFICE VISIT (OUTPATIENT)
Dept: DERMATOLOGY | Facility: CLINIC | Age: 75
End: 2025-07-31
Payer: MEDICARE

## 2025-07-31 DIAGNOSIS — L82.1 SEBORRHEIC KERATOSIS: ICD-10-CM

## 2025-07-31 DIAGNOSIS — L43.0 HYPERTROPHIC LICHEN PLANUS: Primary | ICD-10-CM

## 2025-07-31 DIAGNOSIS — L82.0 INFLAMED SEBORRHEIC KERATOSIS: ICD-10-CM

## 2025-07-31 PROCEDURE — 1159F MED LIST DOCD IN RCRD: CPT | Performed by: DERMATOLOGY

## 2025-07-31 PROCEDURE — 99214 OFFICE O/P EST MOD 30 MIN: CPT | Performed by: DERMATOLOGY

## 2025-07-31 PROCEDURE — 17110 DESTRUCTION B9 LES UP TO 14: CPT | Performed by: DERMATOLOGY

## 2025-07-31 NOTE — Clinical Note
Scattered on the patient's face, neck, and extremities, there are multiple tan- to light brown-colored, hyperkeratotic, stuck-on appearing papules of varying size and shape

## 2025-07-31 NOTE — Clinical Note
On the patient's left lateral superior neck, there is an 8 erythematous and brown-colored, hyperkeratotic, stuck-on appearing papule with a surrounding rim of erythema

## 2025-07-31 NOTE — Clinical Note
Xerosis.  We emphasized the importance of dry, sensitive skin care, including the use of a mild soap, such as Dove, and frequent and aggressive moisturization, at least twice daily and immediately following showers or baths, with recommended over-the-counter moisturizing creams, such as Eucerin, Cetaphil, Cerave, or Aveeno, or Vaseline or Aquaphor ointments.

## 2025-07-31 NOTE — Clinical Note
On her bilateral anterior legs, the left worse than the right, and ankles, there are multiple similar-appearing erythematous to purplish, scaly, hyperkeratotic, thickened papules and small plaques, including several larger 1.5-3 cm plaques.  There are several similar-appearing papules and small plaques on her right distal dorsal forearm and scattered on her left thigh.  On her bilateral anterior legs, more on the right than the left, there are a few erythematous to purplish, slightly hyperkeratotic, thin plaques at the sites of the recently injected lesions.

## 2025-07-31 NOTE — TELEPHONE ENCOUNTER
I called pt today to reschedule her appt after getting cx from 10:45 -- pt original appt was on 08/22/25 , reschedule another pt in that slot , but it looks like her photo was also canceled for 08/22/25 , not sure silvia need to put pt back on the photo ??

## 2025-07-31 NOTE — Clinical Note
Inflamed Seborrheic Keratosis -left lateral superior neck.  The benign nature of this lesion was discussed with the patient today and reassurance provided.  Given the history the patient provides of frequent irritation and associated symptoms as well as its inflamed appearance on exam today, I offered to treat this lesion with liquid nitrogen cryotherapy.  After discussing the risks, benefits, and side effects of cryotherapy, including possible permanent hypo- and/or hyperpigmentation and/or scarring, the patient expressed understanding and wishes to proceed with cryotherapy today.

## 2025-07-31 NOTE — PROGRESS NOTES
Subjective     Maite Loya is a 75 y.o. female who presents for the following: Follow-up.  She was last seen in our office on 4/17/25, at which time she underwent repeat IL steroid injection with Kenalog 20 mg/mL for Hypertrophic Lichen Planus on her legs and wished to initiate narrowband-UVB phototherapy.    Today, the patient reports some improvement in the lesions on her legs following IL steroid injections performed at her last visit, and they became less raised and less itchy.  She also states she initiated UV phototherapy on 6/2/25, and she states she has noticed slight improvement in some of the lesions since, but she has continued to develop new lesions, especially on her left leg, right ankle, and now on her right forearm and left thigh as well, which are all very itchy and raised and scaly.  She states she is using Eucerin on the lesions every day, but not any topical medications on the lesions.    She also notes a raised, rough bump on her left neck, which has been present for several months and frequently itches, especially when she shaves over it, but it has not changed in any other way, including in size, shape, or color, and it does not hurt or bleed.  She denies any other new, changing, or concerning skin lesions since her last visit; no bleeding, itching, or burning lesions.      Review of Systems:  No other skin or systemic complaints other than what is documented elsewhere in the note.    The following portions of the chart were reviewed this encounter and updated as appropriate:       Skin Cancer History  Biopsy Log Book  No skin cancers from Specimen Tracking.    Additional History      Specialty Problems    None      Past Dermatologic / Past Relevant Medical History:    - biopsy-proven Hypertrophic Lichen Planus diagnosed on 5/16/24, currently on narrowband-UVB phototherapy initiated on 6/2/25  - no history of eczema, psoriasis, or hepatitis C    Family History:    No family history of  eczema, psoriasis, or lichen planus    Social History:    The patient states she works as a      Allergies:  Patient has no known allergies.    Current Medications / CAM's:  Current Medications[1]     Objective   Well appearing patient in no apparent distress; mood and affect are within normal limits.    A skin examination was performed including: Face, neck, and extremities. All findings within normal limits unless otherwise noted below.    Assessment/Plan   Skin Exam  1. HYPERTROPHIC LICHEN PLANUS  Right Lower Leg - Anterior  On her bilateral anterior legs, the left worse than the right, and ankles, there are multiple similar-appearing erythematous to purplish, scaly, hyperkeratotic, thickened papules and small plaques, including several larger 1.5-3 cm plaques.  There are several similar-appearing papules and small plaques on her right distal dorsal forearm and scattered on her left thigh.  On her bilateral anterior legs, more on the right than the left, there are a few erythematous to purplish, slightly hyperkeratotic, thin plaques at the sites of the recently injected lesions.  Hypertrophic Lichen Planus - improved on bilateral anterior legs s/p IL steroid injections, but with flare on bilateral leg and ankles, right distal dorsal forearm, and left thigh; biopsy-proven diagnosis.  The potentially chronic and intermittently flaring nature of this condition, the occasional association of LP with Hepatitis C, and management options were discussed extensively with the patient in the office today.  Of note, given the occasional association of LP with Hepatitis C, she had a screening Hepatitis C antibody drawn, which was negative.    In addition, we spent a great deal of time discussing various treatment options, including topical therapy, intra-lesional corticosteroid injections, UV phototherapy, and systemic therapy, such as with Methotrexate.  After discussing the risks, benefits, and side effects of  each of these options at length, including possible permanent skin atrophy following IL steroid injection, the patient expressed understanding and declined IL steroid injections today and wishes to continue UV phototherapy and resume topical therapy.    Thus, I recommend she continue narrowband-UVB phototherapy 3 times weekly and resume topical steroid therapy with Clobetasol 0.05% ointment, which the patient was instructed to apply twice daily to the affected areas of her legs (avoid face, groin, body folds) for the next 3-4 weeks, followed by taper to twice daily on weekends only for persistent areas and/or maintenance and moisturization with a recommended over-the-counter moisturizing cream, such as Eucerin, or Vaseline twice daily on weekdays; the patient may repeat treatment in a 3-4 week burst-and-taper fashion every 6-8 weeks as needed for future flares.  The risks, benefits, and side effects of this medication, including possible skin atrophy with overuse of topical steroids, were discussed.  She was instructed to return to our office in 2-3 months for re-evaluation and was instructed to call our office to schedule a return visit for sooner if she wishes to undergo further IL steroid injections before then.  The patient expressed understanding and is in agreement with this plan.  Existing Treatments  - clobetasol (Temovate) 0.05 % ointment - Apply twice daily to affected areas of legs (avoid face, groin, body folds) for 3-4 weeks, taper to twice daily on weekends only; repeat every 6-8 weeks as needed for flares  2. INFLAMED SEBORRHEIC KERATOSIS  Neck - Anterior  On the patient's left lateral superior neck, there is an 8 erythematous and brown-colored, hyperkeratotic, stuck-on appearing papule with a surrounding rim of erythema  Inflamed Seborrheic Keratosis -left lateral superior neck.  The benign nature of this lesion was discussed with the patient today and reassurance provided.  Given the history the  patient provides of frequent irritation and associated symptoms as well as its inflamed appearance on exam today, I offered to treat this lesion with liquid nitrogen cryotherapy.  After discussing the risks, benefits, and side effects of cryotherapy, including possible permanent hypo- and/or hyperpigmentation and/or scarring, the patient expressed understanding and wishes to proceed with cryotherapy today.  - Destr of lesion - Neck - Anterior  Complexity: simple    Destruction method: cryotherapy    Informed consent: discussed and consent obtained    Lesion destroyed using liquid nitrogen: Yes    Cryotherapy cycles:  2  Outcome: patient tolerated procedure well with no complications    Post-procedure details: wound care instructions given      3. SEBORRHEIC KERATOSIS  Generalized  Scattered on the patient's face, neck, and extremities, there are multiple tan- to light brown-colored, hyperkeratotic, stuck-on appearing papules of varying size and shape  Seborrheic Keratoses - the benign nature of these lesions was discussed with the patient today and reassurance provided.  No treatment is medically indicated for the noninflamed SKs at this time.          [1]   Current Outpatient Medications:     alclometasone (Aclovate) 0.05 % cream, , Disp: , Rfl:     atenolol (Tenormin) 50 mg tablet, TAKE 1 TABLET BY MOUTH EVERY DAY, Disp: 90 tablet, Rfl: 0    cholecalciferol (Vitamin D-3) 5,000 Units tablet, Take by mouth., Disp: , Rfl:     clobetasol (Temovate) 0.05 % ointment, Apply twice daily to affected areas of legs (avoid face, groin, body folds) for 3-4 weeks, taper to twice daily on weekends only; repeat every 6-8 weeks as needed for flares, Disp: 60 g, Rfl: 1    fluocinonide (Lidex) 0.05 % ointment, Apply twice daily to affected areas of legs (avoid face, groin, body folds) for 2 weeks, Disp: 60 g, Rfl: 1    gentamicin (Garamycin) 0.1 % ointment, , Disp: , Rfl:     hydroCHLOROthiazide (HYDRODiuril) 25 mg tablet, TAKE 1  TABLET BY MOUTH EVERY DAY, Disp: 90 tablet, Rfl: 0    neomycin-bacitracin-polymyxin (Polysporin) ophthalmic ointment, , Disp: , Rfl:     triamcinolone (Kenalog) 0.025 % cream, Triamcinolone Acetonide 0.025 % External Cream  Refills: 0     Active, Disp: , Rfl:

## 2025-07-31 NOTE — Clinical Note
Hypertrophic Lichen Planus - improved on bilateral anterior legs s/p IL steroid injections, but with flare on bilateral leg and ankles, right distal dorsal forearm, and left thigh; biopsy-proven diagnosis.  The potentially chronic and intermittently flaring nature of this condition, the occasional association of LP with Hepatitis C, and management options were discussed extensively with the patient in the office today.  Of note, given the occasional association of LP with Hepatitis C, she had a screening Hepatitis C antibody drawn, which was negative.    In addition, we spent a great deal of time discussing various treatment options, including topical therapy, intra-lesional corticosteroid injections, UV phototherapy, and systemic therapy, such as with Methotrexate.  After discussing the risks, benefits, and side effects of each of these options at length, including possible permanent skin atrophy following IL steroid injection, the patient expressed understanding and declined IL steroid injections today and wishes to continue UV phototherapy and resume topical therapy.    Thus, I recommend she continue narrowband-UVB phototherapy 3 times weekly and resume topical steroid therapy with Clobetasol 0.05% ointment, which the patient was instructed to apply twice daily to the affected areas of her legs (avoid face, groin, body folds) for the next 3-4 weeks, followed by taper to twice daily on weekends only for persistent areas and/or maintenance and moisturization with a recommended over-the-counter moisturizing cream, such as Eucerin, or Vaseline twice daily on weekdays; the patient may repeat treatment in a 3-4 week burst-and-taper fashion every 6-8 weeks as needed for future flares.  The risks, benefits, and side effects of this medication, including possible skin atrophy with overuse of topical steroids, were discussed.  She was instructed to return to our office in 2-3 months for re-evaluation and was instructed to  call our office to schedule a return visit for sooner if she wishes to undergo further IL steroid injections before then.  The patient expressed understanding and is in agreement with this plan.

## 2025-08-01 ENCOUNTER — APPOINTMENT (OUTPATIENT)
Dept: DERMATOLOGY | Facility: CLINIC | Age: 75
End: 2025-08-01
Payer: MEDICARE

## 2025-08-04 ENCOUNTER — APPOINTMENT (OUTPATIENT)
Dept: DERMATOLOGY | Facility: CLINIC | Age: 75
End: 2025-08-04
Payer: MEDICARE

## 2025-08-04 DIAGNOSIS — L43.0 HYPERTROPHIC LICHEN PLANUS: ICD-10-CM

## 2025-08-04 PROCEDURE — 96900 ACTINOTHERAPY UV LIGHT: CPT | Performed by: DERMATOLOGY

## 2025-08-04 NOTE — PROGRESS NOTES
Phototherapy Procedure Note:    Daniela Loya is a 75 y.o. female who presents for the following: Phototherapy    Phototherapy - Narrow Band UVB  Diagnosis - (also add to Visit Dx): Lichen Planus, Hypertrophic - L43.0  Phototherapy order has been reviewed?: Yes  Treatment Date: 25  Patient  Verified: Confirmed patient date of birth.  Provider: [place]  Location: [facility]  Treatment Number: 18  Schedule: Number of Treatment(s) per week : 3 times per week  Meter Reading in milliwatts: 3.86  UVB Dose Today mJ/Cm2 (millijoules): 743  UVB Time of Exposure Time in Minutes : Seconds: 3:12  Topical: None  Face and Eye Shielding: Goggles  Genital Shielding: Underwear  Chest Shielding: Long sleeved shirt  Other Shielding: Face  Reaction-Redness/Erythema Grade: 0 - No erythema  NB UVB Reaction - Tenderness: No Tenderness  Treatment Comments: missed treatment; hold at last dose per protocol

## 2025-08-06 ENCOUNTER — APPOINTMENT (OUTPATIENT)
Dept: DERMATOLOGY | Facility: CLINIC | Age: 75
End: 2025-08-06
Payer: MEDICARE

## 2025-08-06 DIAGNOSIS — L43.0 HYPERTROPHIC LICHEN PLANUS: ICD-10-CM

## 2025-08-06 PROCEDURE — 96900 ACTINOTHERAPY UV LIGHT: CPT | Performed by: DERMATOLOGY

## 2025-08-06 NOTE — PROGRESS NOTES
Phototherapy Procedure Note:    Daniela Loya is a 75 y.o. female who presents for the following: Phototherapy    Phototherapy - Narrow Band UVB  Diagnosis - (also add to Visit Dx): Lichen Planus, Hypertrophic - L43.0  Phototherapy order has been reviewed?: Yes  Treatment Date: 25  Patient  Verified: Confirmed patient date of birth.  Provider: Jessica  Location: [facility]  Treatment Number: 19  Schedule: Number of Treatment(s) per week : 3 times per week  Meter Reading in milliwatts: 3.87  UVB Dose Today mJ/Cm2 (millijoules): 803  UVB Time of Exposure Time in Minutes : Seconds: 3:27  Topical: None  Face and Eye Shielding: Goggles  Genital Shielding: Underwear  Chest Shielding: Long sleeved shirt  Other Shielding: Face  Reaction-Redness/Erythema Grade: 0 - No erythema  NB UVB Reaction - Tenderness: No Tenderness  NB UVB Dose Increased by mJ/Cm2: 60

## 2025-08-08 ENCOUNTER — APPOINTMENT (OUTPATIENT)
Dept: DERMATOLOGY | Facility: CLINIC | Age: 75
End: 2025-08-08
Payer: MEDICARE

## 2025-08-08 DIAGNOSIS — L43.0 HYPERTROPHIC LICHEN PLANUS: ICD-10-CM

## 2025-08-08 PROCEDURE — 96900 ACTINOTHERAPY UV LIGHT: CPT | Performed by: DERMATOLOGY

## 2025-08-08 NOTE — PROGRESS NOTES
Phototherapy Procedure Note:    Daniela Loya is a 75 y.o. female who presents for the following: Phototherapy    Phototherapy - Narrow Band UVB  Diagnosis - (also add to Visit Dx): Lichen Planus, Hypertrophic - L43.0  Phototherapy order has been reviewed?: Yes  Treatment Date: 25  Patient  Verified: Confirmed patient date of birth.  Provider: [place]  Location: [facility]  Treatment Number: 20  Schedule: Number of Treatment(s) per week : 3 times per week  Meter Reading in milliwatts: 3.84  UVB Dose Today mJ/Cm2 (millijoules): 863  UVB Time of Exposure Time in Minutes : Seconds: 3:44  Topical: None  Face and Eye Shielding: Goggles  Genital Shielding: Underwear  Chest Shielding: Long sleeved shirt  Other Shielding: Face  Reaction-Redness/Erythema Grade: 0 - No erythema  NB UVB Reaction - Tenderness: No Tenderness  NB UVB Dose Increased by mJ/Cm2: 60

## 2025-08-11 ENCOUNTER — APPOINTMENT (OUTPATIENT)
Dept: DERMATOLOGY | Facility: CLINIC | Age: 75
End: 2025-08-11
Payer: MEDICARE

## 2025-08-11 DIAGNOSIS — L43.0 HYPERTROPHIC LICHEN PLANUS: ICD-10-CM

## 2025-08-11 PROCEDURE — 96900 ACTINOTHERAPY UV LIGHT: CPT | Performed by: DERMATOLOGY

## 2025-08-13 ENCOUNTER — APPOINTMENT (OUTPATIENT)
Dept: DERMATOLOGY | Facility: CLINIC | Age: 75
End: 2025-08-13
Payer: MEDICARE

## 2025-08-13 DIAGNOSIS — L43.0 HYPERTROPHIC LICHEN PLANUS: ICD-10-CM

## 2025-08-13 PROCEDURE — 96900 ACTINOTHERAPY UV LIGHT: CPT | Performed by: DERMATOLOGY

## 2025-08-15 ENCOUNTER — APPOINTMENT (OUTPATIENT)
Dept: DERMATOLOGY | Facility: CLINIC | Age: 75
End: 2025-08-15
Payer: MEDICARE

## 2025-08-15 DIAGNOSIS — L43.0 HYPERTROPHIC LICHEN PLANUS: ICD-10-CM

## 2025-08-15 PROCEDURE — 96900 ACTINOTHERAPY UV LIGHT: CPT | Performed by: DERMATOLOGY

## 2025-08-18 ENCOUNTER — APPOINTMENT (OUTPATIENT)
Dept: DERMATOLOGY | Facility: CLINIC | Age: 75
End: 2025-08-18
Payer: MEDICARE

## 2025-08-18 DIAGNOSIS — L43.0 HYPERTROPHIC LICHEN PLANUS: ICD-10-CM

## 2025-08-18 PROCEDURE — 96900 ACTINOTHERAPY UV LIGHT: CPT | Performed by: DERMATOLOGY

## 2025-08-20 ENCOUNTER — APPOINTMENT (OUTPATIENT)
Dept: DERMATOLOGY | Facility: CLINIC | Age: 75
End: 2025-08-20
Payer: MEDICARE

## 2025-08-22 ENCOUNTER — APPOINTMENT (OUTPATIENT)
Dept: DERMATOLOGY | Facility: CLINIC | Age: 75
End: 2025-08-22
Payer: MEDICARE

## 2025-08-25 ENCOUNTER — APPOINTMENT (OUTPATIENT)
Dept: DERMATOLOGY | Facility: CLINIC | Age: 75
End: 2025-08-25
Payer: MEDICARE

## 2025-08-27 ENCOUNTER — APPOINTMENT (OUTPATIENT)
Dept: DERMATOLOGY | Facility: CLINIC | Age: 75
End: 2025-08-27
Payer: MEDICARE

## 2025-08-29 ENCOUNTER — APPOINTMENT (OUTPATIENT)
Dept: DERMATOLOGY | Facility: CLINIC | Age: 75
End: 2025-08-29
Payer: MEDICARE

## 2025-08-29 DIAGNOSIS — L43.0 LICHEN PLANUS HYPERTROPHICUS: ICD-10-CM

## 2025-08-29 DIAGNOSIS — L43.0 HYPERTROPHIC LICHEN PLANUS: ICD-10-CM

## 2025-08-29 PROCEDURE — 96900 ACTINOTHERAPY UV LIGHT: CPT | Performed by: DERMATOLOGY

## 2025-09-03 ENCOUNTER — APPOINTMENT (OUTPATIENT)
Dept: DERMATOLOGY | Facility: CLINIC | Age: 75
End: 2025-09-03
Payer: MEDICARE

## 2025-09-05 ENCOUNTER — APPOINTMENT (OUTPATIENT)
Dept: DERMATOLOGY | Facility: CLINIC | Age: 75
End: 2025-09-05
Payer: MEDICARE

## 2025-09-17 ENCOUNTER — APPOINTMENT (OUTPATIENT)
Dept: DERMATOLOGY | Facility: CLINIC | Age: 75
End: 2025-09-17
Payer: MEDICARE

## 2025-09-19 ENCOUNTER — APPOINTMENT (OUTPATIENT)
Dept: DERMATOLOGY | Facility: CLINIC | Age: 75
End: 2025-09-19
Payer: MEDICARE

## 2025-09-22 ENCOUNTER — APPOINTMENT (OUTPATIENT)
Dept: DERMATOLOGY | Facility: CLINIC | Age: 75
End: 2025-09-22
Payer: MEDICARE

## 2025-09-24 ENCOUNTER — APPOINTMENT (OUTPATIENT)
Dept: DERMATOLOGY | Facility: CLINIC | Age: 75
End: 2025-09-24
Payer: MEDICARE

## 2025-09-26 ENCOUNTER — APPOINTMENT (OUTPATIENT)
Dept: DERMATOLOGY | Facility: CLINIC | Age: 75
End: 2025-09-26
Payer: MEDICARE

## 2025-09-29 ENCOUNTER — APPOINTMENT (OUTPATIENT)
Dept: DERMATOLOGY | Facility: CLINIC | Age: 75
End: 2025-09-29
Payer: MEDICARE

## 2025-10-01 ENCOUNTER — APPOINTMENT (OUTPATIENT)
Dept: DERMATOLOGY | Facility: CLINIC | Age: 75
End: 2025-10-01
Payer: MEDICARE

## 2025-10-03 ENCOUNTER — APPOINTMENT (OUTPATIENT)
Dept: DERMATOLOGY | Facility: CLINIC | Age: 75
End: 2025-10-03
Payer: MEDICARE

## 2025-11-19 ENCOUNTER — APPOINTMENT (OUTPATIENT)
Dept: DERMATOLOGY | Facility: CLINIC | Age: 75
End: 2025-11-19
Payer: MEDICARE

## 2026-04-16 ENCOUNTER — APPOINTMENT (OUTPATIENT)
Dept: PRIMARY CARE | Facility: CLINIC | Age: 76
End: 2026-04-16
Payer: MEDICARE